# Patient Record
Sex: MALE | Race: BLACK OR AFRICAN AMERICAN | NOT HISPANIC OR LATINO | Employment: FULL TIME | URBAN - METROPOLITAN AREA
[De-identification: names, ages, dates, MRNs, and addresses within clinical notes are randomized per-mention and may not be internally consistent; named-entity substitution may affect disease eponyms.]

---

## 2020-10-09 ENCOUNTER — ANESTHESIA EVENT (OUTPATIENT)
Dept: SURGERY | Facility: HOSPITAL | Age: 50
End: 2020-10-09
Payer: OTHER GOVERNMENT

## 2020-10-21 ENCOUNTER — HOSPITAL ENCOUNTER (OUTPATIENT)
Dept: PREADMISSION TESTING | Facility: HOSPITAL | Age: 50
Discharge: HOME | End: 2020-10-21
Attending: PODIATRIST
Payer: OTHER GOVERNMENT

## 2020-10-21 VITALS
RESPIRATION RATE: 18 BRPM | TEMPERATURE: 98 F | DIASTOLIC BLOOD PRESSURE: 78 MMHG | BODY MASS INDEX: 37.95 KG/M2 | HEART RATE: 69 BPM | SYSTOLIC BLOOD PRESSURE: 148 MMHG | WEIGHT: 256.19 LBS | OXYGEN SATURATION: 99 % | HEIGHT: 69 IN

## 2020-10-21 DIAGNOSIS — Z01.818 PREOPERATIVE EXAMINATION: ICD-10-CM

## 2020-10-21 RX ORDER — SODIUM CHLORIDE, SODIUM LACTATE, POTASSIUM CHLORIDE, CALCIUM CHLORIDE 600; 310; 30; 20 MG/100ML; MG/100ML; MG/100ML; MG/100ML
INJECTION, SOLUTION INTRAVENOUS CONTINUOUS
Status: CANCELLED | OUTPATIENT
Start: 2020-10-21

## 2020-10-21 RX ORDER — LIDOCAINE HYDROCHLORIDE 10 MG/ML
1 INJECTION, SOLUTION EPIDURAL; INFILTRATION; INTRACAUDAL; PERINEURAL ONCE
Status: CANCELLED | OUTPATIENT
Start: 2020-10-21 | End: 2020-10-21

## 2020-10-21 NOTE — DISCHARGE INSTRUCTIONS
Your surgery is scheduled for Friday 10/30    Please report to Front Lobby on the 1st Floor at 0530a.m.    THIS TIME IS SUBJECT TO CHANGE.  YOU WILL RECEIVE A PHONE CALL THE DAY BEFORE SURGERY BY 3:30 PM TO CONFIRM YOUR TIME OF ARRIVAL.  IF YOU HAVE NOT RECEIVED A PHONE CALL BY 3:30 PM THE DAY BEFORE YOUR SURGERY PLEASE CALL [phone]     INSTRUCTIONS IMPORTANT!!!  ¨ Do not eat or drink after 12 midnight-including water. OK to brush teeth, no   gum, candy or mints!    ¨ Take only these medicines with a small swallow of water-morning of surgery.        ____  Proceed to [facility] on for additional testing.        ____  Do not wear makeup, including mascara.  ____  No powder, lotions or creams to surgical area.  ____  Please remove all jewelry, including piercings and leave at home.  ____  No money or valuables needed. Please leave at home.  ____  Please bring any documents given by your doctor.  ____  If going home the same day, arrange for a ride home. You will not be able to             drive if Anesthesia was used.  ____  Children under 18 years require a parent / guardian present the entire time             they are in surgery / recovery.  ____  Wear loose fitting clothing. Allow for dressings, bandages.  ____  Stop Aspirin, Ibuprofen, Motrin and Aleve at least 3-5 days before surgery, unless otherwise instructed by your doctor, or the nurse.   You MAY use Tylenol/acetaminophen until day of surgery.  ____  Wash the surgical area with Hibiclens the night before surgery, and again the             morning of surgery.  Be sure to rinse hibiclens off completely (if instructed by   nurse).  ____  If you take diabetic medication, do not take am of surgery unless instructed by Doctor.  ____  Call MD for temperature above 101 degrees or any other signs of infection such as Urinary (bladder) infection, Upper respiratory infection, skin boils, etc.  ____ Stop taking any Fish Oil supplement or any Vitamins  that contain Vitamin E at least 5 days prior to surgery.  ____ Do Not wear your contact lenses the day of your procedure.  You may wear your glasses.      ____Do not shave surgical site for 3 days prior to surgery.  ____ Practice Good hand washing before, during, and after procedure.      I have read or had read and explained to me, and understand the above information.  Additional comments or instructions:  For additional questions call [phone]      ANESTHESIA SIDE EFFECTS  -For the first 24 hours after surgery:  Do not drive, use heavy equipment, make important decisions, or drink alcohol  -It is normal to feel sleepy for several hours.  Rest until you are more awake.  -Have someone stay with you, if needed.  They can watch for problems and help keep you safe.  -Some possible post anesthesia side effects include: nausea and vomiting, sore throat and hoarseness, sleepiness, and dizziness.        Pre-Op Bathing Instructions    Before surgery, you can play an important role in your own health.    Because skin is not sterile, we need to be sure that your skin is as free of germs as possible. By following the instructions below, you can reduce the number of germs on your skin before surgery.    IMPORTANT: You will need to shower with a special soap called Hibiclens*, available at any pharmacy.  If you are allergic to Chlorhexidine (the antiseptic in Hibiclens), use an antibacterial soap such as Dial Soap for your preoperative shower.  You will shower with Hibiclens both the night before your surgery and the morning of your surgery.  Do not use Hibiclens on the head, face or genitals to avoid injury to those areas.    STEP #1: THE NIGHT BEFORE YOUR SURGERY     1. Do not shave the area of your body where your surgery will be performed.  2. Shower and wash your hair and body as usual with your normal soap and shampoo.  3. Rinse your hair and body thoroughly after you shower to remove all soap residue.  4. With your hand,  apply one packet of Hibiclens soap to the surgical site.   5. Wash the site gently for five (5) minutes. Do not scrub your skin too hard.   6. Do not wash with your regular soap after Hibiclens is used.  7. Rinse your body thoroughly.  8. Pat yourself dry with a clean, soft towel.  9. Do not use lotion, cream, or powder.  10. Wear clean clothes.    STEP #2: THE MORNING OF YOUR SURGERY     1. Repeat Step #1.    * Not to be used by people allergic to Chlorhexidine.      Treating Mallet, Hammer, and Claw Toes  Definitions  A hammer toe has an abnormal bend in the middle joint of your toe (toe is bent upward at joint).  Mallet toe affects the joint nearest your toenail (toe is bent downward at joint).  Claw toe affects the joint at the ball of your foot (toe is bent upward at joint), as well as both toe joints (toe is bent downward at both joints).  Hammer toe and mallet toe are most likely to occur in the toe next to your big toe.  Causes  The most common cause for all 3 deformities is poorly fitting shoes and tight shoes, especially high heels for women. Trauma and nerve damage from various diseases like diabetes may also cause these deformities.  Treatment  Buying shoes with more room in the toes, filing down corns and calluses, and padding, taping, or strapping the toe most often relieve the pain. Toe stretching and exercises may also be helpful. If these steps dont work, you may need surgery to straighten your toes.  Shoes  Buy low-heeled shoes with plenty of room in the front. This keeps your toes from being jammed against the end of your shoe. It also keeps your shoe from rubbing the tops of your toes.  Corns and calluses    To file down a corn or callus, soak your foot in warm water. This softens the hard skin. Dry your foot. Then gently rub the corn or callus with a pumice stone or nail file.  Pads and splints  If you still have pain, you may need to put a pad or splint on your toe. This helps take pressure  off the painful corn or callus.  · For a mallet toe, you can put a gel pad on the toe. This keeps the tip of the toe from rubbing against the bottom of the shoe.  · For a hammer or claw toe, you can put a felt or foam pad over the bent joint. This keeps the toe from rubbing on the top of the shoe.  · For a hammer or claw toe that is still flexible, you can put a splint on the toe. This keeps it straight so it doesn't rub on the top of the shoe.    Date Last Reviewed: 9/29/2015  © 9288-3760 [employer]. [address] [place] [address], [place] [address]. All rights reserved. This information is not intended as a substitute for professional medical care. Always follow your healthcare professional's instructions.        Types of Anesthesia  Your anesthesiologist is a key member of your surgical team. He or she gives you anesthetics (medications to keep you comfortable and decrease your awareness of surgery) and monitors your condition to keep you safe during surgery. You will have 1 of 3 kinds of anesthesia during your surgery.  Monitored anesthesia care (MAC)  · Often used for surgery that is short or not too invasive.  · Sedatives (medicines to relax you) are given through an IV (intravenous) line.  · The area around the surgical site is usually numbed with a local anesthetic.  · You may choose to remain awake or sleep lightly.  Regional anesthesia (sometimes called spinal epidural or juliet block)  · Often used for surgery on the arms, legs, and abdomen. It is also used during childbirth.  · A specific region of your body is numbed by injecting anesthetic near nerves, near your spine, or near the operative site.  · You may also be given sedatives through an IV line to relax you.  · With regional anesthesia, you may choose to remain awake or sleep lightly.  General anesthesia  · Often used for extensive surgery, such as on the heart, brain, or abdominal operation.  · Also used when the patient wants to be totally  asleep.  · May be given as a gas that you breathe and as medicines that are injected through an IV line.  · Because you are asleep, you feel no pain and remember nothing of the surgery.  The risks and complications of anesthesia depend on your overall health. If you are healthy, the risks are low. The risks are higher for patients with heart or lung problems. Your anesthesiologist or nurse anesthetist will discuss the risks with you.   Date Last Reviewed: 12/1/2016 © 2000-2017 [employer]. [address] [place] [address], [place] [address]. All rights reserved. This information is not intended as a substitute for professional medical care. Always follow your healthcare professional's instructions.

## 2020-10-21 NOTE — PRE-PROCEDURE INSTRUCTIONS
Covid 10/27 1050 [facility]        Allergies, medical, surgical, family and psychosocial histories reviewed with patient. Periop plan of care reviewed. Preop instructions given, including medications to take and to hold. Hibiclens soap and instructions on use given. Time allotted for questions to be addressed.  Patient verbalized understanding.      Wife Nguyen Matias

## 2020-10-21 NOTE — ANESTHESIA PREPROCEDURE EVALUATION
10/21/2020  Giorgi Matias is a 50 y.o., male scheduled for left hammer toe correction and endoscopic plantar fasciotomy on 10/30/2020.    Outside labs, x-ray, and optimization in media.    History reviewed. No pertinent past medical history.     Past Surgical History:   Procedure Laterality Date    CARPAL TUNNEL RELEASE Bilateral     SHOULDER ARTHROSCOPY W/ ROTATOR CUFF REPAIR Right     SHOULDER SURGERY       Anesthesia Evaluation    I have reviewed the Patient Summary Reports.    I have reviewed the Nursing Notes.    I have reviewed the Medications.     Review of Systems  Anesthesia Hx:  No problems with previous Anesthesia  Denies Family Hx of Anesthesia complications.    Social:  Non-Smoker, Social Alcohol Use    Hematology/Oncology:  Hematology Normal        Cardiovascular:  Cardiovascular Normal Exercise tolerance: good   Denies Angina.        Pulmonary:  Pulmonary Normal    Renal/:  Renal/ Normal     Hepatic/GI:  Hepatic/GI Normal    Neurological:  Neurology Normal    Endocrine:  Endocrine Normal        Physical Exam  General:  Obesity    Airway/Jaw/Neck:  Airway Findings: Mouth Opening: Normal Tongue: Normal  General Airway Assessment: Adult  Mallampati: III  TM Distance: Normal, at least 6 cm       Chest/Lungs:  Chest/Lungs Findings: Clear to auscultation, Normal Respiratory Rate     Heart/Vascular:  Heart Findings: Rate: Normal  Sounds: Normal        Mental Status:  Mental Status Findings:  Cooperative, Alert and Oriented         Anesthesia Plan  Type of Anesthesia, risks & benefits discussed:  Anesthesia Type:  MAC  Patient's Preference:   Intra-op Monitoring Plan: standard ASA monitors  Intra-op Monitoring Plan Comments:   Post Op Pain Control Plan: multimodal analgesia  Post Op Pain Control Plan Comments:   Induction:   IV  Beta Blocker:  Patient is not currently on a Beta-Blocker (No  further documentation required).       Informed Consent: Patient understands risks and agrees with Anesthesia plan.  Questions answered. Anesthesia consent signed with patient.  ASA Score: 2     Day of Surgery Review of History & Physical:        Anesthesia Plan Notes: Anesthesia consent to be signed prior to procedure on 10/30/2020  Covid testing scheduled 10/27          Ready For Surgery From Anesthesia Perspective.

## 2020-10-27 ENCOUNTER — LAB VISIT (OUTPATIENT)
Dept: SURGERY | Facility: CLINIC | Age: 50
End: 2020-10-27
Payer: OTHER GOVERNMENT

## 2020-10-27 DIAGNOSIS — Z01.818 PREOPERATIVE EXAMINATION: ICD-10-CM

## 2020-10-27 LAB — SARS-COV-2 RNA RESP QL NAA+PROBE: NOT DETECTED

## 2020-10-27 PROCEDURE — U0003 INFECTIOUS AGENT DETECTION BY NUCLEIC ACID (DNA OR RNA); SEVERE ACUTE RESPIRATORY SYNDROME CORONAVIRUS 2 (SARS-COV-2) (CORONAVIRUS DISEASE [COVID-19]), AMPLIFIED PROBE TECHNIQUE, MAKING USE OF HIGH THROUGHPUT TECHNOLOGIES AS DESCRIBED BY CMS-2020-01-R: HCPCS

## 2020-10-29 RX ORDER — CEFAZOLIN SODIUM 2 G/50ML
2 SOLUTION INTRAVENOUS ONCE
Status: CANCELLED | OUTPATIENT
Start: 2020-10-30

## 2020-10-30 ENCOUNTER — HOSPITAL ENCOUNTER (OUTPATIENT)
Facility: HOSPITAL | Age: 50
Discharge: HOME | End: 2020-10-30
Attending: PODIATRIST | Admitting: PODIATRIST
Payer: OTHER GOVERNMENT

## 2020-10-30 ENCOUNTER — ANESTHESIA (OUTPATIENT)
Dept: SURGERY | Facility: HOSPITAL | Age: 50
End: 2020-10-30
Payer: OTHER GOVERNMENT

## 2020-10-30 VITALS
RESPIRATION RATE: 18 BRPM | SYSTOLIC BLOOD PRESSURE: 173 MMHG | DIASTOLIC BLOOD PRESSURE: 98 MMHG | TEMPERATURE: 98 F | BODY MASS INDEX: 37.92 KG/M2 | HEART RATE: 79 BPM | WEIGHT: 256 LBS | HEIGHT: 69 IN | OXYGEN SATURATION: 98 %

## 2020-10-30 DIAGNOSIS — M20.42 HAMMERTOE OF LEFT FOOT: Primary | ICD-10-CM

## 2020-10-30 PROCEDURE — 71000015 HC POSTOP RECOV 1ST HR: Performed by: PODIATRIST

## 2020-10-30 PROCEDURE — 25000003 PHARM REV CODE 250: Performed by: NURSE ANESTHETIST, CERTIFIED REGISTERED

## 2020-10-30 PROCEDURE — 01480 ANES OPEN PX LOWER L/A/F NOS: CPT | Performed by: PODIATRIST

## 2020-10-30 PROCEDURE — 37000009 HC ANESTHESIA EA ADD 15 MINS: Performed by: PODIATRIST

## 2020-10-30 PROCEDURE — 25000003 PHARM REV CODE 250: Performed by: PODIATRIST

## 2020-10-30 PROCEDURE — 27201423 OPTIME MED/SURG SUP & DEVICES STERILE SUPPLY: Performed by: PODIATRIST

## 2020-10-30 PROCEDURE — 63600175 PHARM REV CODE 636 W HCPCS: Performed by: NURSE ANESTHETIST, CERTIFIED REGISTERED

## 2020-10-30 PROCEDURE — 36000707: Performed by: PODIATRIST

## 2020-10-30 PROCEDURE — 63600175 PHARM REV CODE 636 W HCPCS: Performed by: PODIATRIST

## 2020-10-30 PROCEDURE — 37000008 HC ANESTHESIA 1ST 15 MINUTES: Performed by: PODIATRIST

## 2020-10-30 PROCEDURE — 36000706: Performed by: PODIATRIST

## 2020-10-30 PROCEDURE — 71000016 HC POSTOP RECOV ADDL HR: Performed by: PODIATRIST

## 2020-10-30 RX ORDER — PROPOFOL 10 MG/ML
INJECTION, EMULSION INTRAVENOUS CONTINUOUS PRN
Status: DISCONTINUED | OUTPATIENT
Start: 2020-10-30 | End: 2020-10-30

## 2020-10-30 RX ORDER — LIDOCAINE HYDROCHLORIDE 10 MG/ML
INJECTION, SOLUTION EPIDURAL; INFILTRATION; INTRACAUDAL; PERINEURAL
Status: DISCONTINUED | OUTPATIENT
Start: 2020-10-30 | End: 2020-10-30 | Stop reason: HOSPADM

## 2020-10-30 RX ORDER — MIDAZOLAM HYDROCHLORIDE 1 MG/ML
INJECTION, SOLUTION INTRAMUSCULAR; INTRAVENOUS
Status: DISCONTINUED | OUTPATIENT
Start: 2020-10-30 | End: 2020-10-30

## 2020-10-30 RX ORDER — PROPOFOL 10 MG/ML
VIAL (ML) INTRAVENOUS
Status: DISCONTINUED | OUTPATIENT
Start: 2020-10-30 | End: 2020-10-30

## 2020-10-30 RX ORDER — FENTANYL CITRATE 50 UG/ML
INJECTION, SOLUTION INTRAMUSCULAR; INTRAVENOUS
Status: DISCONTINUED | OUTPATIENT
Start: 2020-10-30 | End: 2020-10-30

## 2020-10-30 RX ORDER — OXYCODONE AND ACETAMINOPHEN 10; 325 MG/1; MG/1
1 TABLET ORAL EVERY 4 HOURS PRN
Status: DISCONTINUED | OUTPATIENT
Start: 2020-10-30 | End: 2020-10-30 | Stop reason: HOSPADM

## 2020-10-30 RX ORDER — SODIUM CHLORIDE, SODIUM LACTATE, POTASSIUM CHLORIDE, CALCIUM CHLORIDE 600; 310; 30; 20 MG/100ML; MG/100ML; MG/100ML; MG/100ML
INJECTION, SOLUTION INTRAVENOUS CONTINUOUS PRN
Status: DISCONTINUED | OUTPATIENT
Start: 2020-10-30 | End: 2020-10-30

## 2020-10-30 RX ORDER — LIDOCAINE HCL/PF 100 MG/5ML
SYRINGE (ML) INTRAVENOUS
Status: DISCONTINUED | OUTPATIENT
Start: 2020-10-30 | End: 2020-10-30

## 2020-10-30 RX ORDER — ROPIVACAINE HYDROCHLORIDE 5 MG/ML
INJECTION, SOLUTION EPIDURAL; INFILTRATION; PERINEURAL
Status: DISCONTINUED | OUTPATIENT
Start: 2020-10-30 | End: 2020-10-30 | Stop reason: HOSPADM

## 2020-10-30 RX ADMIN — PROPOFOL 50 MG: 10 INJECTION, EMULSION INTRAVENOUS at 03:10

## 2020-10-30 RX ADMIN — LIDOCAINE HYDROCHLORIDE 100 MG: 20 INJECTION, SOLUTION INTRAVENOUS at 03:10

## 2020-10-30 RX ADMIN — PROPOFOL 100 MCG/KG/MIN: 10 INJECTION, EMULSION INTRAVENOUS at 03:10

## 2020-10-30 RX ADMIN — OXYCODONE HYDROCHLORIDE AND ACETAMINOPHEN 1 TABLET: 10; 325 TABLET ORAL at 04:10

## 2020-10-30 RX ADMIN — PROPOFOL 30 MG: 10 INJECTION, EMULSION INTRAVENOUS at 03:10

## 2020-10-30 RX ADMIN — SODIUM CHLORIDE, SODIUM LACTATE, POTASSIUM CHLORIDE, AND CALCIUM CHLORIDE: .6; .31; .03; .02 INJECTION, SOLUTION INTRAVENOUS at 03:10

## 2020-10-30 RX ADMIN — MIDAZOLAM 2 MG: 1 INJECTION INTRAMUSCULAR; INTRAVENOUS at 03:10

## 2020-10-30 RX ADMIN — DEXTROSE 2 G: 50 INJECTION, SOLUTION INTRAVENOUS at 03:10

## 2020-10-30 RX ADMIN — FENTANYL CITRATE 50 MCG: 50 INJECTION, SOLUTION INTRAMUSCULAR; INTRAVENOUS at 03:10

## 2020-10-30 NOTE — DISCHARGE INSTRUCTIONS
ANESTHESIA  -For the first 24 hours after surgery:  Do not drive, use heavy equipment, make important decisions, or drink alcohol  -It is normal to feel sleepy for several hours.  Rest until you are more awake.  -Have someone stay with you, if needed.  They can watch for problems and help keep you safe.  -Some possible post anesthesia side effects include: nausea and vomiting, sore throat and hoarseness, sleepiness, and dizziness.    PAIN  -If you have pain after surgery, pain medicine will help you feel better.  Take it as directed, before pain becomes severe.  Most pain relievers taken by mouth need at least 20-30 minutes to start working.  -Do not drive or drink alcohol while taking pain medicine.  -Pain medication can upset your stomach.  Taking them with a little food may help.  -Other ways to help control pain: elevation, ice, and relaxation  -Call your surgeon if still having unmanageable pain an hour after taking pain medicine.  -Pain medicine can cause constipation.  Taking an over-the counter stool softener while on prescription pain medicine and drinking plenty of fluids can prevent this side effect.  -Call your surgeon if you have severe side effects like: breathing problems, trouble waking up, dizziness, confusion, or severe constipation.    NAUSEA  -Some people have nausea after surgery.  This is often because of anesthesia, pain, pain medicine, or the stress of surgery.  -Do not push yourself to eat.  Start off with clear liquids and soup.  Slowly move to solid foods.  Don't eat fatty, rich, spicy foods at first.  Eat smaller amounts.  -If you develop persistent nausea and vomiting please notify your surgeon immediately.    BLEEDING  -Different types of surgery require different types of care and dressing changes.  It is important to follow all instructions and advice from your surgeon.  Change dressing as directed.  Call your surgeon for any concerns regarding postop bleeding.    SIGNS OF  INFECTION  -Signs of infection include: fever, swelling, drainage, and redness  -Notify your surgeon if you have a fever of 100.4 F (38.0 C) or higher.  -Notify your surgeon if you notice redness, swelling, increased pain, pus, or a foul smell at the incision site.            Endoscopic Plantar Fasciotomy (EPF): After Surgery  Although you may feel fine when you are discharged, it is best to have someone drive you home. Your doctor may want you to rest and recover at home for a few days. Ask your doctor when you can start walking again. If a compression dressing is used to control swelling, you may need to wear a special surgical shoe. You may also need to wear a short leg brace for up to 3 weeks.  Recovering at home    Expect your foot to feel numb right after the surgery. Then, as the local anesthesia wears off, youll probably feel some pain. To limit pain and swelling, ice the foot for 10 to 15 minutes several times a day. Also, raise the foot above heart level as often as you can. If you've been given pain medicines, take them as directed  Your first post-op visit  Your doctor may want to see you the first 1 to 2 weeks after surgery. During this post-op visit, your incisions will be checked to make sure they are healing. The compression dressing may be replaced with a smaller surgical dressing. If this occurs, you can probably start wearing tennis shoes.  When to call your healthcare provider   Call your healthcare provider if you have any of these:  · The dressing is too tight or there is marked swelling or numbness of the toes  · Pain despite taking medicines  · More than a few drops of blood at an incision site  · Signs of an infection, including fever, chills, and redness near an incision  · Skin discoloration beyond the dressing   Date Last Reviewed: 7/1/2016  © 2991-4697 "[employer]. [address] [place] [address], [place] [address]. All rights reserved. This information is not intended as a  substitute for professional medical care. Always follow your healthcare professional's instructions.

## 2020-10-30 NOTE — PLAN OF CARE
Pt meets discharge critiera, pain controlled, VSS, tolerating clear liquids, VSS. Discharge instructions given to pt and wife with time for questions and printed copy also given.

## 2020-10-30 NOTE — TRANSFER OF CARE
"Anesthesia Transfer of Care Note    Patient: Giorgi Matias    Procedure(s) Performed: Procedure(s) (LRB):  CORRECTION, HAMMER TOE (Left)  FASCIOTOMY, PLANTAR, ENDOSCOPIC (Left)    Patient location: OPS    Anesthesia Type: MAC    Transport from OR: Transported from OR on room air with adequate spontaneous ventilation    Post pain: adequate analgesia    Post assessment: no apparent anesthetic complications    Post vital signs: stable    Level of consciousness: awake, alert and oriented    Nausea/Vomiting: no nausea/vomiting    Complications: none    Transfer of care protocol was followed      Last vitals:   Visit Vitals  BP (!) 154/72 (BP Location: Right arm, Patient Position: Lying)   Pulse 74   Temp 36.7 °C (98.1 °F) (Skin)   Resp 16   Ht 5' 9" (1.753 m)   Wt 116.1 kg (256 lb)   SpO2 100%   BMI 37.80 kg/m²     "

## 2020-10-30 NOTE — BRIEF OP NOTE
[facility]  Brief Operative Note    Surgery Date: 10/30/2020     Surgeon(s) and Role:     * Kranthi Mahoney DPM - Primary    Assisting Surgeon: Lindsey Arvizu DPM-Resident    Pre-op Diagnosis:  Acquired hammer toe of left foot [M20.42]  Plantar fasciitis of left foot [M72.2]    Post-op Diagnosis:  Post-Op Diagnosis Codes:     * Acquired hammer toe of left foot [M20.42]     * Plantar fasciitis of left foot [M72.2]    Procedure(s) (LRB):  Partial lateral condylectomy 5th distal phalanx (Left)  FASCIOTOMY, PLANTAR, ENDOSCOPIC (Left)    Anesthesia: Local MAC    Description of the findings of the procedure(s):   Partial condylectomy of lateral 5th distal phalanx. Endoscopic plantar fasciotomy    Estimated Blood Loss: < 1 mL         Specimens:   Specimen (12h ago, onward)    None

## 2020-10-30 NOTE — PLAN OF CARE
Pt rec'd to OPS from OR with CRNA and RN, pt connected to monitors and assessed. Report rec'd. See flowsheets for VS and assessments.

## 2020-10-30 NOTE — DISCHARGE SUMMARY
[facility]  Discharge Note  Short Stay    Procedure(s) (LRB):  CORRECTION, HAMMER TOE (Left)  FASCIOTOMY, PLANTAR, ENDOSCOPIC (Left)    OUTCOME: Patient tolerated treatment/procedure well without complication and is now ready for discharge.    DISPOSITION: Home or Self Care    FINAL DIAGNOSIS:  <principal problem not specified>    FOLLOWUP: In clinic    DISCHARGE INSTRUCTIONS:    Discharge Procedure Orders   Diet general     Keep surgical extremity elevated     Ice to affected area   Order Comments: Apply ice pack to behind left knee     Call MD for:  temperature >100.4     Call MD for:  persistent nausea and vomiting     Call MD for:  severe uncontrolled pain     Call MD for:  difficulty breathing, headache or visual disturbances     Call MD for:  redness, tenderness, or signs of infection (pain, swelling, redness, odor or green/yellow discharge around incision site)     Call MD for:  hives     Call MD for:  persistent dizziness or light-headedness     Call MD for:  extreme fatigue     Leave dressing on - Keep it clean, dry, and intact until clinic visit     Weight bearing restrictions (specify)   Order Comments: Partial weightbearing to left heel only in post op surgical shoe

## 2020-10-30 NOTE — H&P
Primary        10/21/2020   Giorgi Matias is a 50 y.o., male scheduled for left hammer toe correction and endoscopic plantar fasciotomy on 10/30/2020.   Outside labs, x-ray, and optimization in media.   History reviewed. No pertinent past medical history.          Past Surgical History:   Procedure Laterality Date    CARPAL TUNNEL RELEASE Bilateral     SHOULDER ARTHROSCOPY W/ ROTATOR CUFF REPAIR Right     SHOULDER SURGERY     Anesthesia Evaluation     I have reviewed the Patient Summary Reports.   I have reviewed the Nursing Notes.   I have reviewed the Medications.   Review of Systems   Anesthesia Hx:   No problems with previous Anesthesia Denies Family Hx of Anesthesia complications.   Social:   Non-Smoker, Social Alcohol Use   Hematology/Oncology:   Hematology Normal   Cardiovascular:   Cardiovascular Normal Exercise tolerance: good Denies Angina.   Pulmonary:   Pulmonary Normal   Renal/:   Renal/ Normal   Hepatic/GI:   Hepatic/GI Normal   Neurological:   Neurology Normal   Endocrine:   Endocrine Normal       Physical Exam   General:   Obesity   Airway/Jaw/Neck:   Airway Findings: Mouth Opening: Normal Tongue: Normal General Airway Assessment: Adult Mallampati: III TM Distance: Normal, at least 6 cm   Chest/Lungs:   Chest/Lungs Findings: Clear to auscultation, Normal Respiratory Rate   Heart/Vascular:   Heart Findings: Rate: Normal Sounds: Normal   Mental Status:   Mental Status Findings: Cooperative, Alert and Oriented       Anesthesia Plan     Proceed with surgery

## 2020-11-01 NOTE — OP NOTE
[facility]  Operative Note      Date of Procedure: 10/30/2020     Procedure: Procedure(s) (LRB):  Partial lateral condylectomy 5th distal phalanx (Left)  FASCIOTOMY, PLANTAR, ENDOSCOPIC (Left)    Surgeon(s) and Role:     * Kranthi Mahoney DPM - Primary    Assisting Surgeon: None    Pre-Operative Diagnosis: Acquired hammer toe of left foot [M20.42]  Plantar fasciitis of left foot [M72.2]    Post-Operative Diagnosis: Post-Op Diagnosis Codes:     * Acquired hammer toe of left foot [M20.42]     * Plantar fasciitis of left foot [M72.2]    Anesthesia: Local MAC    Procedure in detail:  The patient was brought to the operating room on a stretcher in a supine position. Following the successful induction of MAC anesthesia, a well padded pneumatic tourniquet was applied on the left ankle. A timeout was performed verifying the patient's identity, surgical site, allergies, and medications. All were in agreement. A local infiltrative block was administered to the left foot using 20 ml of a 1:1 mixture of 1% lidocaine plain and 0.5% Ropivicaine plain. A timeout was performed verifying the patient's identity, surgical site, allergies, and medications. All were in agreement. The left foot was prepped and draped in a sterile manner. An esmarch was used to exsanguinate the left foot. Tourniquet was inflated to 250mmHg.    Attention was directed to the left medial heel anterior to the plantar fat pad where the medial portal was created with a  #15 scalpel through the skin. Blunt dissection was completed through the subcutaneous tissue using a hemostat. A blunt elevator was used to create a channel along the inferior portion of the plantar fascia to the lateral side of the foot. Blunt trochar and cannula was placed from medial to lateral and trochar removed. Q-tips were used to remove fat from the cannula. A 4.0 mm camera was used to visualize the plantar fascia and hook knife to cut the medial half of the plantar  fascia while dorsiflexing the foot and toes. The underlying abductor muscle was visualized confirming the plantar fascia had been cut medially. The incision was flushed with normal sterile saline solution and skin was reapproximated with 3-0 nylon.    Next, attention was directed to the left 5th toe. A curvilinear incision was made at the distal lateral 5th toe using a #15 blade. Sharp and blunt dissection was performed down to the level of bone in which the lateral condyle of the 5th distal phalanx was exposed. The lateral condyle was resected using a sagittal saw. Incision site was re approximated with 3-0 nylon suture.    Surgical incision sites were covered with triple antibiotic ointment and a sterile compressive dressing consisting of 4x4 gauze, Melany, and Ace bandage was applied to the left foot. The patient's left ankle tourniquet was deflated. Immediate capillary refill was noted to all digits of the left foot. The patient's left foot was placed in a surgical shoe and the patient was transferred to the recovery room under the care of anesthesia team with vital signs stable and vascular status intact.      Following a period of post op monitoring, the patient was discharged home with the following post op instructions:   1. Keep dressing dry and intact until Podiatry follow up.   2.Weight bearing status: partial weightbearing to heel only in post op surgical shoe left foot  3. All necessary prescriptions ordered and medical management will continue.   4. Contact Podiatry for all post op follow up care and if any problems arise.        Description of the Findings of the Procedure: as above    Significant Surgical Tasks Conducted by the Assistant(s), if Applicable: NA    Complications: No    Estimated Blood Loss (EBL): <1 mL           Implants: * No implants in log *    Specimens:   Specimen (12h ago, onward)    None                  Condition: Good    Disposition: PACU - hemodynamically  stable.    Attestation: I was present and scrubbed for the entire procedure.

## 2021-05-06 ENCOUNTER — PATIENT MESSAGE (OUTPATIENT)
Dept: RESEARCH | Facility: HOSPITAL | Age: 51
End: 2021-05-06

## 2021-09-23 ENCOUNTER — ANESTHESIA EVENT (OUTPATIENT)
Dept: SURGERY | Facility: HOSPITAL | Age: 51
End: 2021-09-23
Payer: OTHER GOVERNMENT

## 2021-09-24 ENCOUNTER — HOSPITAL ENCOUNTER (OUTPATIENT)
Facility: HOSPITAL | Age: 51
Discharge: HOME | End: 2021-09-24
Attending: PODIATRIST | Admitting: PODIATRIST
Payer: OTHER GOVERNMENT

## 2021-09-24 ENCOUNTER — ANESTHESIA (OUTPATIENT)
Dept: SURGERY | Facility: HOSPITAL | Age: 51
End: 2021-09-24
Payer: OTHER GOVERNMENT

## 2021-09-24 VITALS
BODY MASS INDEX: 38.51 KG/M2 | HEIGHT: 69 IN | DIASTOLIC BLOOD PRESSURE: 72 MMHG | WEIGHT: 260 LBS | SYSTOLIC BLOOD PRESSURE: 134 MMHG | HEART RATE: 88 BPM | TEMPERATURE: 98 F | RESPIRATION RATE: 16 BRPM | OXYGEN SATURATION: 98 %

## 2021-09-24 DIAGNOSIS — M72.2 PLANTAR FASCIITIS, LEFT: ICD-10-CM

## 2021-09-24 LAB — SARS-COV-2 RDRP RESP QL NAA+PROBE: NEGATIVE

## 2021-09-24 PROCEDURE — 25000003 PHARM REV CODE 250: Performed by: NURSE ANESTHETIST, CERTIFIED REGISTERED

## 2021-09-24 PROCEDURE — 63600175 PHARM REV CODE 636 W HCPCS: Performed by: NURSE ANESTHETIST, CERTIFIED REGISTERED

## 2021-09-24 PROCEDURE — 01470 ANES PX NRV MSC LW L/A/F NOS: CPT | Performed by: PODIATRIST

## 2021-09-24 PROCEDURE — 25000003 PHARM REV CODE 250: Performed by: PODIATRIST

## 2021-09-24 PROCEDURE — 37000008 HC ANESTHESIA 1ST 15 MINUTES: Performed by: PODIATRIST

## 2021-09-24 PROCEDURE — 71000015 HC POSTOP RECOV 1ST HR: Performed by: PODIATRIST

## 2021-09-24 PROCEDURE — 37000009 HC ANESTHESIA EA ADD 15 MINS: Performed by: PODIATRIST

## 2021-09-24 PROCEDURE — U0002 COVID-19 LAB TEST NON-CDC: HCPCS | Performed by: PODIATRIST

## 2021-09-24 PROCEDURE — 36000706: Performed by: PODIATRIST

## 2021-09-24 PROCEDURE — 36000707: Performed by: PODIATRIST

## 2021-09-24 PROCEDURE — 63600175 PHARM REV CODE 636 W HCPCS: Performed by: PODIATRIST

## 2021-09-24 RX ORDER — TRIAMCINOLONE ACETONIDE 40 MG/ML
INJECTION, SUSPENSION INTRA-ARTICULAR; INTRAMUSCULAR
Status: DISCONTINUED | OUTPATIENT
Start: 2021-09-24 | End: 2021-09-24 | Stop reason: HOSPADM

## 2021-09-24 RX ORDER — PROPOFOL 10 MG/ML
VIAL (ML) INTRAVENOUS CONTINUOUS PRN
Status: DISCONTINUED | OUTPATIENT
Start: 2021-09-24 | End: 2021-09-24

## 2021-09-24 RX ORDER — PROPOFOL 10 MG/ML
VIAL (ML) INTRAVENOUS
Status: DISCONTINUED | OUTPATIENT
Start: 2021-09-24 | End: 2021-09-24

## 2021-09-24 RX ORDER — FENTANYL CITRATE 50 UG/ML
INJECTION, SOLUTION INTRAMUSCULAR; INTRAVENOUS
Status: DISCONTINUED | OUTPATIENT
Start: 2021-09-24 | End: 2021-09-24

## 2021-09-24 RX ORDER — CEFAZOLIN SODIUM 2 G/50ML
2 SOLUTION INTRAVENOUS ONCE
Status: DISCONTINUED | OUTPATIENT
Start: 2021-09-24 | End: 2021-09-24

## 2021-09-24 RX ORDER — ROPIVACAINE HYDROCHLORIDE 5 MG/ML
INJECTION, SOLUTION EPIDURAL; INFILTRATION; PERINEURAL
Status: DISCONTINUED | OUTPATIENT
Start: 2021-09-24 | End: 2021-09-24 | Stop reason: HOSPADM

## 2021-09-24 RX ORDER — SODIUM CHLORIDE, SODIUM LACTATE, POTASSIUM CHLORIDE, CALCIUM CHLORIDE 600; 310; 30; 20 MG/100ML; MG/100ML; MG/100ML; MG/100ML
INJECTION, SOLUTION INTRAVENOUS CONTINUOUS PRN
Status: DISCONTINUED | OUTPATIENT
Start: 2021-09-24 | End: 2021-09-24

## 2021-09-24 RX ORDER — CEFAZOLIN SODIUM 2 G/50ML
2 SOLUTION INTRAVENOUS ONCE
Status: DISCONTINUED | OUTPATIENT
Start: 2021-09-24 | End: 2021-09-24 | Stop reason: HOSPADM

## 2021-09-24 RX ORDER — LIDOCAINE HYDROCHLORIDE 10 MG/ML
INJECTION, SOLUTION EPIDURAL; INFILTRATION; INTRACAUDAL; PERINEURAL
Status: DISCONTINUED | OUTPATIENT
Start: 2021-09-24 | End: 2021-09-24 | Stop reason: HOSPADM

## 2021-09-24 RX ORDER — MIDAZOLAM HYDROCHLORIDE 1 MG/ML
INJECTION INTRAMUSCULAR; INTRAVENOUS
Status: DISCONTINUED | OUTPATIENT
Start: 2021-09-24 | End: 2021-09-24

## 2021-09-24 RX ORDER — LIDOCAINE HYDROCHLORIDE 20 MG/ML
INJECTION, SOLUTION EPIDURAL; INFILTRATION; INTRACAUDAL; PERINEURAL
Status: DISCONTINUED | OUTPATIENT
Start: 2021-09-24 | End: 2021-09-24

## 2021-09-24 RX ADMIN — LIDOCAINE HYDROCHLORIDE 80 MG: 20 INJECTION, SOLUTION EPIDURAL; INFILTRATION; INTRACAUDAL; PERINEURAL at 07:09

## 2021-09-24 RX ADMIN — FENTANYL CITRATE 50 MCG: 50 INJECTION, SOLUTION INTRAMUSCULAR; INTRAVENOUS at 08:09

## 2021-09-24 RX ADMIN — MIDAZOLAM HYDROCHLORIDE 2 MG: 1 INJECTION, SOLUTION INTRAMUSCULAR; INTRAVENOUS at 07:09

## 2021-09-24 RX ADMIN — SODIUM CHLORIDE, SODIUM LACTATE, POTASSIUM CHLORIDE, AND CALCIUM CHLORIDE: .6; .31; .03; .02 INJECTION, SOLUTION INTRAVENOUS at 07:09

## 2021-09-24 RX ADMIN — FENTANYL CITRATE 50 MCG: 50 INJECTION, SOLUTION INTRAMUSCULAR; INTRAVENOUS at 07:09

## 2021-09-24 RX ADMIN — PROPOFOL 80 MG: 10 INJECTION, EMULSION INTRAVENOUS at 07:09

## 2021-09-24 RX ADMIN — PROPOFOL 80 MCG/KG/MIN: 10 INJECTION, EMULSION INTRAVENOUS at 07:09

## 2022-01-04 ENCOUNTER — OFFICE VISIT (OUTPATIENT)
Dept: PAIN MEDICINE | Facility: CLINIC | Age: 52
End: 2022-01-04
Payer: OTHER GOVERNMENT

## 2022-01-04 VITALS
DIASTOLIC BLOOD PRESSURE: 82 MMHG | WEIGHT: 260 LBS | SYSTOLIC BLOOD PRESSURE: 145 MMHG | HEIGHT: 69 IN | HEART RATE: 80 BPM | BODY MASS INDEX: 38.51 KG/M2

## 2022-01-04 DIAGNOSIS — M47.896 OTHER SPONDYLOSIS, LUMBAR REGION: Primary | ICD-10-CM

## 2022-01-04 PROCEDURE — 99204 PR OFFICE/OUTPT VISIT, NEW, LEVL IV, 45-59 MIN: ICD-10-PCS | Mod: S$PBB,,, | Performed by: ANESTHESIOLOGY

## 2022-01-04 PROCEDURE — 99213 OFFICE O/P EST LOW 20 MIN: CPT | Mod: PBBFAC,PN | Performed by: ANESTHESIOLOGY

## 2022-01-04 PROCEDURE — 99999 PR PBB SHADOW E&M-EST. PATIENT-LVL III: CPT | Mod: PBBFAC,,, | Performed by: ANESTHESIOLOGY

## 2022-01-04 PROCEDURE — 99204 OFFICE O/P NEW MOD 45 MIN: CPT | Mod: S$PBB,,, | Performed by: ANESTHESIOLOGY

## 2022-01-04 PROCEDURE — 99999 PR PBB SHADOW E&M-EST. PATIENT-LVL III: ICD-10-PCS | Mod: PBBFAC,,, | Performed by: ANESTHESIOLOGY

## 2022-01-04 RX ORDER — CHOLECALCIFEROL (VITAMIN D3) 50 MCG
TABLET ORAL
COMMUNITY
Start: 2021-05-17

## 2022-01-04 RX ORDER — OMEGA-3S/DHA/EPA/FISH OIL/D3 300MG-1000
CAPSULE ORAL
COMMUNITY
Start: 2021-07-23 | End: 2022-01-04

## 2022-01-04 RX ORDER — ROSUVASTATIN CALCIUM 10 MG/1
1 TABLET, COATED ORAL NIGHTLY
COMMUNITY
Start: 2021-05-17 | End: 2023-12-14

## 2022-01-04 RX ORDER — MELOXICAM 15 MG/1
15 TABLET ORAL DAILY
Qty: 90 TABLET | Refills: 0 | Status: SHIPPED | OUTPATIENT
Start: 2022-01-04 | End: 2023-12-08

## 2022-01-04 RX ORDER — TERBINAFINE HYDROCHLORIDE 250 MG/1
250 TABLET ORAL DAILY
COMMUNITY
Start: 2021-10-25 | End: 2023-12-08

## 2022-01-04 RX ORDER — AMOXICILLIN 500 MG/1
500 CAPSULE ORAL 2 TIMES DAILY
COMMUNITY
Start: 2021-10-15 | End: 2023-12-08

## 2022-01-04 RX ORDER — MIRTAZAPINE 30 MG/1
TABLET, FILM COATED ORAL
COMMUNITY
Start: 2021-09-30 | End: 2023-12-14

## 2022-01-04 RX ORDER — CHOLECALCIFEROL (VITAMIN D3) 50 MCG
TABLET ORAL
COMMUNITY
Start: 2021-07-23 | End: 2022-01-04

## 2022-01-04 RX ORDER — DICLOFENAC SODIUM 10 MG/G
GEL TOPICAL
COMMUNITY
Start: 2021-11-11

## 2022-01-04 RX ORDER — TRAZODONE HYDROCHLORIDE 50 MG/1
TABLET ORAL NIGHTLY PRN
COMMUNITY
Start: 2021-07-28

## 2022-01-04 NOTE — PROGRESS NOTES
"Referring Physician: [facility], [place], operated by [facility]*    PCP: Primary Doctor No    CC: back pain    HPI:   Giorgi Matias is a 51 y.o. male with PMH significant for hx of left heel open plantar fascia release (9/24/2021), hx of right rotator cuff repair, and hx of b/l CTS release presents as a referral for the evaluation of back pain. The patient reports that his pain began approximately 4-5 years ago after no inciting incident or trauma. The patient reports of worsening of his pain since onset. The patient localizes his pain to the area across his lower back. The patient denies of radiation of his pain. The patient describes his pain as a "tightness". The patient reports of intermittent numbness in his BLE's. The patient reports that his pain is a 5-6/10. The patient reports that his pain can increase to a 9-10/10 at its worst. Patient denies of any urinary/fecal incontinence, saddle anesthesia, or weakness.     Aggravating factors: bending, walking, sitting on the toilet    Mitigating factors: laying down    Relevant Surgeries: no    Interventional Therapies: no    : Not applicable      Non-pharmacologic Treatment:     · Physical Therapy: yes  · Ice/Heat: no  · TENS: yes  · Massage: no  · Chiropractic care: no  · Acupuncture: no         Pain Medications:         · Currently taking: OTC aleve    · Has tried in the past:    · Opioids: yes  · NSAIDS: yes; mild benefit with aleve  · Tylenol: yes  · Muscle relaxants: yes; tried in the past  · TCAs: no  · SNRIs: no  · Anticonvulsants: yes; tried gabapentin years ago - stopped taking as it provided him with no relief  · topical creams: yes; Voltaren     Anticoagulation: no    ROS:  Review of Systems   Constitutional: Negative for chills and fever.   HENT: Negative for tinnitus.    Eyes: Negative for visual disturbance.   Respiratory: Negative for shortness of breath.    Cardiovascular: Negative for chest pain.   Gastrointestinal: Negative for nausea and vomiting. " "  Genitourinary: Negative for difficulty urinating.   Musculoskeletal: Positive for back pain.   Skin: Negative for rash.   Allergic/Immunologic: Negative for immunocompromised state.   Neurological: Positive for numbness. Negative for syncope.   Hematological: Does not bruise/bleed easily.   Psychiatric/Behavioral: Negative for suicidal ideas.        Past Medical History:   Diagnosis Date    Sleep apnea      Past Surgical History:   Procedure Laterality Date    CARPAL TUNNEL RELEASE Bilateral     CORRECTION OF HAMMER TOE Left 10/30/2020    Procedure: CORRECTION, HAMMER TOE;  Surgeon: Kranthi Mahoney DPM;  Location: [facility] OR;  Service: Podiatry;  Laterality: Left;  LOCAL MAC + REGIONAL  Brice Vigueradha notified 10/19, EF brice confirmed 10/29/2020 KB    ENDOSCOPIC PLANTAR FASCIOTOMY Left 10/30/2020    Procedure: FASCIOTOMY, PLANTAR, ENDOSCOPIC;  Surgeon: Kranthi Mahoney DPM;  Location: [facility] OR;  Service: Podiatry;  Laterality: Left;  video    SHOULDER ARTHROSCOPY W/ ROTATOR CUFF REPAIR Right     SHOULDER SURGERY      SURGICAL REMOVAL OF FASCIA Left 9/24/2021    Procedure: FASCIECTOMY;  Surgeon: Kranthi Mahoney DPM;  Location: [facility] OR;  Service: Podiatry;  Laterality: Left;  Anesthesia: Regional Nerve Block  Rep:None     No family history on file.  Social History     Socioeconomic History    Marital status:    Tobacco Use    Smoking status: Never Smoker    Smokeless tobacco: Never Used   Substance and Sexual Activity    Alcohol use: Yes    Drug use: Never         Allergies: See med card    Vitals:    01/04/22 0823   Weight: 117.9 kg (260 lb)   Height: 5' 9" (1.753 m)   PainSc:   5   PainLoc: Back         Physical exam:  Physical Exam  Vitals and nursing note reviewed.   Constitutional:       Appearance: He is not diaphoretic.   HENT:      Head: Normocephalic and atraumatic.   Eyes:      General:         Right eye: No discharge.         Left eye: No discharge.      Extraocular Movements: EOM normal. "      Conjunctiva/sclera: Conjunctivae normal.   Cardiovascular:      Rate and Rhythm: Normal rate.   Pulmonary:      Effort: Pulmonary effort is normal. No respiratory distress.      Breath sounds: Normal breath sounds.   Abdominal:      Palpations: Abdomen is soft.   Skin:     General: Skin is warm and dry.      Findings: No rash.   Neurological:      Mental Status: He is alert and oriented to person, place, and time.   Psychiatric:         Mood and Affect: Mood and affect normal.         Cognition and Memory: Memory normal.         Judgment: Judgment normal.          UPPER EXTREMITIES: Normal alignment, normal range of motion, no atrophy, no skin changes,  hair growth and nail growth normal and equal bilaterally. No swelling, no tenderness.    LOWER EXTREMITIES:  Normal alignment, normal range of motion, no atrophy, no skin changes,  hair growth and nail growth normal and equal bilaterally. No swelling, no tenderness.    LUMBAR SPINE  Lumbar spine: ROM is full with flexion extension and oblique extension with increased pain with extension.     ((--)) Supine straight leg raise    ((+)) Facet loading   Internal and external rotation of the hip causes no increased pain on either side.  Myofascial exam: No tenderness to palpation across lumbar paraspinous muscles.    ((--)) TTP at the SI joint  ((--)) RAHEEL's test  ((--)) One leg stand    ((--)) Distraction test  ((--)) Thigh thrust    CRANIAL NERVES:  II:  PERRL bilaterally,   III,IV,VI: EOMI.    V:  Facial sensation equal bilaterally  VII:  Facial motor function normal.  VIII:  Hearing equal to finger rub bilaterally  IX/X: Gag normal, palate symmetric  XI:  Shoulder shrug equal, head turn equal  XII:  Tongue midline without fasciculations      MOTOR: Tone and bulk: normal bilateral upper and lower Strength: normal   Delt Bi Tri WE WF     R 5 5 5 5 5 5   L 5 5 5 5 5 5     IP ADD ABD Quad TA Gas HAM  R 5 5 5 5 5 5 5  L 5 5 5 5 5 5 5    SENSATION: Light touch and  "pinprick intact bilaterally  REFLEXES: normal, symmetric, nonbrisk.  Toes down, no clonus. Negative flor's sign bilaterally.  GAIT: normal rise, base, steps, and arm swing.      Imaging: [facility] [place] [facility]. Patient reports that he was told he had "severe arthritis."     Assessment: Giorgi Matias is a 51 y.o. male with PMH significant for hx of left heel open plantar fascia release (9/24/2021), hx of right rotator cuff repair, and hx of b/l CTS release presents as a referral for the evaluation of back pain. Patient reports that he had a [facility] done at the [facility] in [place] [facility] where he was told he had "severe arthritis." This was not available for my review. Treatment plan outlined below.     Plan:  - Recommend starting meloxicam 15 mg daily as needed for pain. The patient was advised that NSAID-type medications have two very important potential side effects: gastrointestinal irritation including hemorrhage and renal injuries. The patient was asked to take the medication with food and to cease therapy in the presence of any abnormal GI symptoms. Do not take this with any other medications in the NSAID family, such as ibuprofen, aspirin, and naproxen.  - DIONISIO for [facility] of the lumbar spine at the [facility] in [place]   - I have stressed the importance of physical activity and a home exercise plan to help with chronic pain and improve health.  - RTC s/p imaging to discuss results and interventions as appropriate.    Thank you for referring this interesting patient, and I look forward to continuing to collaborate in his care.    Mj Babcock MD  Pain Management    "

## 2022-01-25 ENCOUNTER — OFFICE VISIT (OUTPATIENT)
Dept: PAIN MEDICINE | Facility: CLINIC | Age: 52
End: 2022-01-25
Payer: OTHER GOVERNMENT

## 2022-01-25 VITALS
HEIGHT: 69 IN | WEIGHT: 260 LBS | HEART RATE: 80 BPM | OXYGEN SATURATION: 100 % | SYSTOLIC BLOOD PRESSURE: 160 MMHG | DIASTOLIC BLOOD PRESSURE: 87 MMHG | BODY MASS INDEX: 38.51 KG/M2 | TEMPERATURE: 99 F

## 2022-01-25 DIAGNOSIS — M51.36 DDD (DEGENERATIVE DISC DISEASE), LUMBAR: ICD-10-CM

## 2022-01-25 DIAGNOSIS — M47.896 OTHER SPONDYLOSIS, LUMBAR REGION: Primary | ICD-10-CM

## 2022-01-25 DIAGNOSIS — Z01.818 PRE-OP TESTING: ICD-10-CM

## 2022-01-25 PROCEDURE — 99214 PR OFFICE/OUTPT VISIT, EST, LEVL IV, 30-39 MIN: ICD-10-PCS | Mod: S$PBB,,, | Performed by: ANESTHESIOLOGY

## 2022-01-25 PROCEDURE — 99999 PR PBB SHADOW E&M-EST. PATIENT-LVL III: ICD-10-PCS | Mod: PBBFAC,,, | Performed by: ANESTHESIOLOGY

## 2022-01-25 PROCEDURE — 99999 PR PBB SHADOW E&M-EST. PATIENT-LVL III: CPT | Mod: PBBFAC,,, | Performed by: ANESTHESIOLOGY

## 2022-01-25 PROCEDURE — 99213 OFFICE O/P EST LOW 20 MIN: CPT | Mod: PBBFAC,PN | Performed by: ANESTHESIOLOGY

## 2022-01-25 PROCEDURE — 99214 OFFICE O/P EST MOD 30 MIN: CPT | Mod: S$PBB,,, | Performed by: ANESTHESIOLOGY

## 2022-01-25 NOTE — PROGRESS NOTES
"FOLLOW UP NOTE:     CHIEF COMPLAINT: back pain    INITIAL HISTORY OF PRESENT ILLNESS: Giorgi Matias is a 51 y.o. male with PMH significant for hx of left heel open plantar fascia release (9/24/2021), hx of right rotator cuff repair, and hx of b/l CTS release presents as a referral for the evaluation of back pain. The patient reports that his pain began approximately 4-5 years ago after no inciting incident or trauma. The patient reports of worsening of his pain since onset. The patient localizes his pain to the area across his lower back. The patient denies of radiation of his pain. The patient describes his pain as a "tightness". The patient reports of intermittent numbness in his BLE's. The patient reports that his pain is a 5-6/10. The patient reports that his pain can increase to a 9-10/10 at its worst. Patient denies of any urinary/fecal incontinence, saddle anesthesia, or weakness.      Aggravating factors: bending, walking, sitting on the toilet     Mitigating factors: laying down    INTERVAL HISTORY OF PRESENT ILLNESS: Giorgi Matias is a 51 y.o. male with PMH significant for hx of left heel open plantar fascia release (9/24/2021), hx of right rotator cuff repair, and hx of b/l CTS release presents as an established patient for the continued management of back pain. The patient localizes his pain to the area across his lower back. The patient denies of radiation of his pain. The patient reports that certain physical activities worsen his pain such as bending. The patient denies of benefit with Mobic. The patient reports that his pain is a 6/10. Patient denies of any urinary/fecal incontinence, saddle anesthesia, or weakness.     The patient presents for my review of his MRI of the lumbar spine which the patient was able to access through his phone portal.     INTERVENTIONAL PAIN HISTORY: N/A    CURRENT PAIN MEDICATIONS:    meloxicam 15 mg daily - no benefit       ROS:  Review of Systems   Constitutional: " "Negative for chills and fever.   HENT: Negative for tinnitus.    Eyes: Negative for visual disturbance.   Respiratory: Negative for shortness of breath.    Cardiovascular: Negative for chest pain.   Gastrointestinal: Negative for nausea and vomiting.   Genitourinary: Negative for difficulty urinating.   Musculoskeletal: Positive for arthralgias and back pain.   Skin: Negative for rash.   Allergic/Immunologic: Negative for immunocompromised state.   Neurological: Positive for numbness. Negative for syncope.   Hematological: Does not bruise/bleed easily.   Psychiatric/Behavioral: Negative for suicidal ideas.        MEDICAL, SURGICAL, FAMILY, SOCIAL HX: reviewed    MEDICATIONS/ALLERGIES: reviewed    PHYSICAL EXAM:    VITALS: Vitals reviewed.   Vitals:    01/25/22 0755   BP: (!) 160/87   Pulse: 80   Temp: 98.6 °F (37 °C)   SpO2: 100%   Weight: 117.9 kg (260 lb)   Height: 5' 9" (1.753 m)   PainSc:   6   PainLoc: Back       Physical Exam  Vitals and nursing note reviewed.   Constitutional:       Appearance: He is not diaphoretic.   HENT:      Head: Normocephalic and atraumatic.   Eyes:      General:         Right eye: No discharge.         Left eye: No discharge.      Extraocular Movements: EOM normal.      Conjunctiva/sclera: Conjunctivae normal.   Cardiovascular:      Rate and Rhythm: Normal rate.   Pulmonary:      Effort: Pulmonary effort is normal. No respiratory distress.      Breath sounds: Normal breath sounds.   Abdominal:      Palpations: Abdomen is soft.   Skin:     General: Skin is warm and dry.      Findings: No rash.   Neurological:      Mental Status: He is alert and oriented to person, place, and time.   Psychiatric:         Mood and Affect: Mood and affect normal.         Cognition and Memory: Memory normal.         Judgment: Judgment normal.          UPPER EXTREMITIES: Normal alignment, normal range of motion, no atrophy, no skin changes,  hair growth and nail growth normal and equal bilaterally. No " swelling, no tenderness.    LOWER EXTREMITIES:  Normal alignment, normal range of motion, no atrophy, no skin changes,  hair growth and nail growth normal and equal bilaterally. No swelling, no tenderness.     LUMBAR SPINE  Lumbar spine: ROM is full with flexion extension and oblique extension with increased pain with extension.     ((--)) Supine straight leg raise    ((+)) Facet loading   Internal and external rotation of the hip causes no increased pain on either side.  Myofascial exam: No tenderness to palpation across lumbar paraspinous muscles.     MOTOR: Tone and bulk: normal bilateral upper and lower Strength: normal   Delt      Bi         Tri        WE      WF                        R          5          5          5          5          5          5            L          5          5          5          5          5          5               IP         ADD     ABD     Quad   TA        Gas      HAM  R          5          5          5          5          5          5          5  L          5          5          5          5          5          5          5     SENSATION: Light touch and pinprick intact bilaterally  REFLEXES: normal, symmetric, nonbrisk.  Toes down, no clonus. Negative flro's sign bilaterally.  GAIT: normal rise, base, steps, and arm swing.      IMAGING: MRI report significant for multi-level DDD, facet arthropathy, and neural foraminal stenosis.     ASSESSMENT: Giorgi Matias is a 51 y.o. male with PMH significant for hx of left heel open plantar fascia release (9/24/2021), hx of right rotator cuff repair, and hx of b/l CTS release presents as an established patient for the continued management of back pain. The patient localizes his pain to the area across his lower back without radiation of his pain. MRI report significant for multi-level DDD, facet arthropathy, and neural foraminal stenosis. I believe all the aforementioned pathologies are contributing to his current pain. Treatment  plan outlined below.     PLAN:  1. I believe his low back pain maybe due to facet arthropathy and have recommended lumbar medial branch blocks as a diagnostic procedure.  If successful, would proceed with radiofrequency ablation. Schedule for bilateral L2, L3, L4, and L5 medial branch blocks.   2. I have stressed the importance of physical activity and a home exercise plan to help with chronic pain and improve health.  3. Continue Mobic daily for pain and inflammation  4. RTC for the procedure as outlined above     Mj Babcock MD  Pain Management    Addendum:           MRI

## 2022-01-25 NOTE — H&P (VIEW-ONLY)
"FOLLOW UP NOTE:     CHIEF COMPLAINT: back pain    INITIAL HISTORY OF PRESENT ILLNESS: Giorgi Matias is a 51 y.o. male with PMH significant for hx of left heel open plantar fascia release (9/24/2021), hx of right rotator cuff repair, and hx of b/l CTS release presents as a referral for the evaluation of back pain. The patient reports that his pain began approximately 4-5 years ago after no inciting incident or trauma. The patient reports of worsening of his pain since onset. The patient localizes his pain to the area across his lower back. The patient denies of radiation of his pain. The patient describes his pain as a "tightness". The patient reports of intermittent numbness in his BLE's. The patient reports that his pain is a 5-6/10. The patient reports that his pain can increase to a 9-10/10 at its worst. Patient denies of any urinary/fecal incontinence, saddle anesthesia, or weakness.      Aggravating factors: bending, walking, sitting on the toilet     Mitigating factors: laying down    INTERVAL HISTORY OF PRESENT ILLNESS: Giorgi Matias is a 51 y.o. male with PMH significant for hx of left heel open plantar fascia release (9/24/2021), hx of right rotator cuff repair, and hx of b/l CTS release presents as an established patient for the continued management of back pain. The patient localizes his pain to the area across his lower back. The patient denies of radiation of his pain. The patient reports that certain physical activities worsen his pain such as bending. The patient denies of benefit with Mobic. The patient reports that his pain is a 6/10. Patient denies of any urinary/fecal incontinence, saddle anesthesia, or weakness.     The patient presents for my review of his MRI of the lumbar spine which the patient was able to access through his phone portal.     INTERVENTIONAL PAIN HISTORY: N/A    CURRENT PAIN MEDICATIONS:    meloxicam 15 mg daily - no benefit       ROS:  Review of Systems   Constitutional: " "Negative for chills and fever.   HENT: Negative for tinnitus.    Eyes: Negative for visual disturbance.   Respiratory: Negative for shortness of breath.    Cardiovascular: Negative for chest pain.   Gastrointestinal: Negative for nausea and vomiting.   Genitourinary: Negative for difficulty urinating.   Musculoskeletal: Positive for arthralgias and back pain.   Skin: Negative for rash.   Allergic/Immunologic: Negative for immunocompromised state.   Neurological: Positive for numbness. Negative for syncope.   Hematological: Does not bruise/bleed easily.   Psychiatric/Behavioral: Negative for suicidal ideas.        MEDICAL, SURGICAL, FAMILY, SOCIAL HX: reviewed    MEDICATIONS/ALLERGIES: reviewed    PHYSICAL EXAM:    VITALS: Vitals reviewed.   Vitals:    01/25/22 0755   BP: (!) 160/87   Pulse: 80   Temp: 98.6 °F (37 °C)   SpO2: 100%   Weight: 117.9 kg (260 lb)   Height: 5' 9" (1.753 m)   PainSc:   6   PainLoc: Back       Physical Exam  Vitals and nursing note reviewed.   Constitutional:       Appearance: He is not diaphoretic.   HENT:      Head: Normocephalic and atraumatic.   Eyes:      General:         Right eye: No discharge.         Left eye: No discharge.      Extraocular Movements: EOM normal.      Conjunctiva/sclera: Conjunctivae normal.   Cardiovascular:      Rate and Rhythm: Normal rate.   Pulmonary:      Effort: Pulmonary effort is normal. No respiratory distress.      Breath sounds: Normal breath sounds.   Abdominal:      Palpations: Abdomen is soft.   Skin:     General: Skin is warm and dry.      Findings: No rash.   Neurological:      Mental Status: He is alert and oriented to person, place, and time.   Psychiatric:         Mood and Affect: Mood and affect normal.         Cognition and Memory: Memory normal.         Judgment: Judgment normal.          UPPER EXTREMITIES: Normal alignment, normal range of motion, no atrophy, no skin changes,  hair growth and nail growth normal and equal bilaterally. No " swelling, no tenderness.    LOWER EXTREMITIES:  Normal alignment, normal range of motion, no atrophy, no skin changes,  hair growth and nail growth normal and equal bilaterally. No swelling, no tenderness.     LUMBAR SPINE  Lumbar spine: ROM is full with flexion extension and oblique extension with increased pain with extension.     ((--)) Supine straight leg raise    ((+)) Facet loading   Internal and external rotation of the hip causes no increased pain on either side.  Myofascial exam: No tenderness to palpation across lumbar paraspinous muscles.     MOTOR: Tone and bulk: normal bilateral upper and lower Strength: normal   Delt      Bi         Tri        WE      WF                        R          5          5          5          5          5          5            L          5          5          5          5          5          5               IP         ADD     ABD     Quad   TA        Gas      HAM  R          5          5          5          5          5          5          5  L          5          5          5          5          5          5          5     SENSATION: Light touch and pinprick intact bilaterally  REFLEXES: normal, symmetric, nonbrisk.  Toes down, no clonus. Negative flor's sign bilaterally.  GAIT: normal rise, base, steps, and arm swing.      IMAGING: MRI report significant for multi-level DDD, facet arthropathy, and neural foraminal stenosis.     ASSESSMENT: Giorgi Matias is a 51 y.o. male with PMH significant for hx of left heel open plantar fascia release (9/24/2021), hx of right rotator cuff repair, and hx of b/l CTS release presents as an established patient for the continued management of back pain. The patient localizes his pain to the area across his lower back without radiation of his pain. MRI report significant for multi-level DDD, facet arthropathy, and neural foraminal stenosis. I believe all the aforementioned pathologies are contributing to his current pain. Treatment  plan outlined below.     PLAN:  1. I believe his low back pain maybe due to facet arthropathy and have recommended lumbar medial branch blocks as a diagnostic procedure.  If successful, would proceed with radiofrequency ablation. Schedule for bilateral L2, L3, L4, and L5 medial branch blocks.   2. I have stressed the importance of physical activity and a home exercise plan to help with chronic pain and improve health.  3. Continue Mobic daily for pain and inflammation  4. RTC for the procedure as outlined above     Mj Babcock MD  Pain Management    Addendum:           MRI

## 2022-02-04 ENCOUNTER — LAB VISIT (OUTPATIENT)
Dept: PRIMARY CARE CLINIC | Facility: CLINIC | Age: 52
End: 2022-02-04
Payer: OTHER GOVERNMENT

## 2022-02-04 DIAGNOSIS — Z01.818 PRE-OP TESTING: ICD-10-CM

## 2022-02-04 PROCEDURE — U0005 INFEC AGEN DETEC AMPLI PROBE: HCPCS | Performed by: ANESTHESIOLOGY

## 2022-02-04 PROCEDURE — U0003 INFECTIOUS AGENT DETECTION BY NUCLEIC ACID (DNA OR RNA); SEVERE ACUTE RESPIRATORY SYNDROME CORONAVIRUS 2 (SARS-COV-2) (CORONAVIRUS DISEASE [COVID-19]), AMPLIFIED PROBE TECHNIQUE, MAKING USE OF HIGH THROUGHPUT TECHNOLOGIES AS DESCRIBED BY CMS-2020-01-R: HCPCS | Performed by: ANESTHESIOLOGY

## 2022-02-05 LAB
SARS-COV-2 RNA RESP QL NAA+PROBE: NOT DETECTED
SARS-COV-2- CYCLE NUMBER: NORMAL

## 2022-02-07 ENCOUNTER — HOSPITAL ENCOUNTER (OUTPATIENT)
Facility: AMBULARY SURGERY CENTER | Age: 52
Discharge: HOME | End: 2022-02-07
Attending: ANESTHESIOLOGY | Admitting: ANESTHESIOLOGY
Payer: OTHER GOVERNMENT

## 2022-02-07 DIAGNOSIS — M47.896 OTHER SPONDYLOSIS, LUMBAR REGION: Primary | ICD-10-CM

## 2022-02-07 PROCEDURE — 64494 PR INJ DX/THER AGNT PARAVERT FACET JOINT,IMG GUIDE,LUMBAR/SAC, 2ND LEVEL: ICD-10-PCS | Mod: 50,,, | Performed by: ANESTHESIOLOGY

## 2022-02-07 PROCEDURE — 64493 PR INJ DX/THER AGNT PARAVERT FACET JOINT,IMG GUIDE,LUMBAR/SAC,1ST LVL: ICD-10-PCS | Mod: 50,,, | Performed by: ANESTHESIOLOGY

## 2022-02-07 PROCEDURE — 64494 INJ PARAVERT F JNT L/S 2 LEV: CPT | Mod: RT | Performed by: ANESTHESIOLOGY

## 2022-02-07 PROCEDURE — 64495 INJ PARAVERT F JNT L/S 3 LEV: CPT | Mod: RT | Performed by: ANESTHESIOLOGY

## 2022-02-07 PROCEDURE — 64493 INJ PARAVERT F JNT L/S 1 LEV: CPT | Mod: LT | Performed by: ANESTHESIOLOGY

## 2022-02-07 PROCEDURE — 64494 INJ PARAVERT F JNT L/S 2 LEV: CPT | Mod: 50,,, | Performed by: ANESTHESIOLOGY

## 2022-02-07 PROCEDURE — 64495 PR INJ DX/THER AGNT PARAVERT FACET JOINT,IMG GUIDE,LUMBAR/SAC, ADD LEVEL: ICD-10-PCS | Mod: 50,,, | Performed by: ANESTHESIOLOGY

## 2022-02-07 PROCEDURE — 64493 INJ PARAVERT F JNT L/S 1 LEV: CPT | Mod: 50,,, | Performed by: ANESTHESIOLOGY

## 2022-02-07 PROCEDURE — 64495 INJ PARAVERT F JNT L/S 3 LEV: CPT | Mod: 50,,, | Performed by: ANESTHESIOLOGY

## 2022-02-07 RX ORDER — SODIUM CHLORIDE, SODIUM LACTATE, POTASSIUM CHLORIDE, CALCIUM CHLORIDE 600; 310; 30; 20 MG/100ML; MG/100ML; MG/100ML; MG/100ML
INJECTION, SOLUTION INTRAVENOUS ONCE AS NEEDED
Status: COMPLETED | OUTPATIENT
Start: 2022-02-07 | End: 2022-02-07

## 2022-02-07 RX ORDER — LIDOCAINE HYDROCHLORIDE 10 MG/ML
INJECTION, SOLUTION EPIDURAL; INFILTRATION; INTRACAUDAL; PERINEURAL
Status: DISCONTINUED | OUTPATIENT
Start: 2022-02-07 | End: 2022-02-07 | Stop reason: HOSPADM

## 2022-02-07 RX ORDER — MIDAZOLAM HYDROCHLORIDE 2 MG/2ML
INJECTION, SOLUTION INTRAMUSCULAR; INTRAVENOUS
Status: DISCONTINUED | OUTPATIENT
Start: 2022-02-07 | End: 2022-02-07 | Stop reason: HOSPADM

## 2022-02-07 RX ORDER — BUPIVACAINE HYDROCHLORIDE 5 MG/ML
INJECTION, SOLUTION EPIDURAL; INTRACAUDAL
Status: DISCONTINUED | OUTPATIENT
Start: 2022-02-07 | End: 2022-02-07 | Stop reason: HOSPADM

## 2022-02-07 RX ADMIN — SODIUM CHLORIDE, SODIUM LACTATE, POTASSIUM CHLORIDE, CALCIUM CHLORIDE: 600; 310; 30; 20 INJECTION, SOLUTION INTRAVENOUS at 12:02

## 2022-02-07 NOTE — OP NOTE
PROCEDURE DATE: 2/7/2022    PROCEDURE:  Bilateral L2, L3, L4, and L5 medial branch nerve blocks    DIAGNOSIS:  Other lumbar spondylosis    Post Op diagnosis: Same    PHYSICIAN: Mj Babcock MD    MEDICATIONS INJECTED: 0.5% bupivicaine, 0.5 ml at each level    SEDATION MEDICATIONS: RN IV sedation    LOCAL ANESTHETIC USED: 1% lidocaine, 1 mL at each level    ESTIMATED BLOOD LOSS:  none    COMPLICATIONS:  none    TECHNIQUE: A time out was taken to identify the patient, procedure and side of the procedure. The patient was placed in a prone position, then prepped and draped in the usual sterile fashion using ChloraPrep and sterile towels.  The levels were determined under fluoroscopic guidance and then marked.  A 22-gauge 5 inch needle was introduced to the anatomic location of the L2, L3, L4, and L5 medial branch nerves on the bilateral side. The above medication was then injected. The patient tolerated the procedure well.     The patient was monitored after the procedure. Patient was given pain diary to record pain levels at home.     If found to have greater than a 50% recovery and so will be scheduled for a radiofrequency ablation of the corresponding nerves.  Patient was given post procedure and discharge instructions to follow at home.  The patient was discharged in a stable condition.

## 2022-02-07 NOTE — DISCHARGE INSTRUCTIONS
Before leaving, please make sure you have all your personal belongings such as glasses, purses, wallets, keys, cell phones, jewelry, jackets etc     Nerve Block  This was a test, not a treatment. Your pain may return.  Keep your pain journal Dr office will call to check your pain levels within 3 days   Perform activities that normally cause you pain during this testing period    Home care instructions  You may apply ice pack to the injection site for 2 days , NO HEAT for 2 days  You may take a shower but no soaking above level of injection in tub or pool for 2 days  Resume Aspirin, Plavix, or Coumadin the day after the procedure unless otherwise instructed.  Do not drive for 24 hr      SEEK  IMMEDIATE MEDICAL HELP FOR:  Severe increase in your usual pain or appearance of new pain  Prolonged  ( more than 8 hours)or increasing weakness or numbness in the legs or arms  Drainage, redness, active bleeding, or increased swelling at the injection site  Temperature over 100.0 degrees F.  Headache that increases when your head is upright and decreases when you lie flat  Shortness of breath, chest pain, or problems breathing      After Surgery:  Always be aware that any surgery can cause these symptoms:    Pain- After this  test, we expect your pain to decrease but  then it may return as the local anesthetic wears off. Try to NOT take your pain medicine during this testing period. Ice and rest can help with pain relief.    Bleeding- a little bleeding after a surgery is usually within normal.  If there is a lot of blood you need to notify your MD.  Emergency treatments of bleeding are cold application, elevation of the bleeding site and compression.    Infection- Infection after surgery is NOT a normal occurrence.  Signs of infection are fever, swelling, hot to touch the incision.  If this occurs notify your MD immediately.    Nausea- this can be common after a surgery especially if you have had anesthesia medicine or are  taking pain medicine.  Staying on clear liquids, bland foods, gingerale, or over the counter anti nausea medicines can help.  If you vomit more than once, notify your MD.  Anti Nausea medicines can be prescribed.

## 2022-02-07 NOTE — DISCHARGE SUMMARY
[facility]  Discharge Note  Short Stay    Procedure(s) (LRB):  Block-nerve-medial branch-lumbar L2,3,4,5 (Bilateral)    OUTCOME: Patient tolerated treatment/procedure well without complication and is now ready for discharge.    DISPOSITION: Home or Self Care    FINAL DIAGNOSIS:  Other spondylosis, lumbar    FOLLOWUP: In clinic    DISCHARGE INSTRUCTIONS:    Discharge Procedure Orders   Diet general     Call MD for:  temperature >100.4     Call MD for:  persistent nausea and vomiting     Call MD for:  severe uncontrolled pain     Call MD for:  difficulty breathing, headache or visual disturbances     Call MD for:  redness, tenderness, or signs of infection (pain, swelling, redness, odor or green/yellow discharge around incision site)     Call MD for:  hives     Call MD for:  persistent dizziness or light-headedness     Call MD for:  extreme fatigue        TIME SPENT ON DISCHARGE: 15 minutes

## 2022-02-08 ENCOUNTER — TELEPHONE (OUTPATIENT)
Dept: PAIN MEDICINE | Facility: CLINIC | Age: 52
End: 2022-02-08
Payer: OTHER GOVERNMENT

## 2022-02-08 VITALS
RESPIRATION RATE: 18 BRPM | TEMPERATURE: 98 F | DIASTOLIC BLOOD PRESSURE: 101 MMHG | SYSTOLIC BLOOD PRESSURE: 219 MMHG | BODY MASS INDEX: 38.38 KG/M2 | WEIGHT: 259.94 LBS | HEART RATE: 83 BPM | OXYGEN SATURATION: 98 %

## 2022-03-07 ENCOUNTER — PATIENT MESSAGE (OUTPATIENT)
Dept: PAIN MEDICINE | Facility: CLINIC | Age: 52
End: 2022-03-07
Payer: OTHER GOVERNMENT

## 2023-09-11 ENCOUNTER — OCCUPATIONAL HEALTH (OUTPATIENT)
Dept: URGENT CARE | Facility: CLINIC | Age: 53
End: 2023-09-11
Payer: OTHER GOVERNMENT

## 2023-09-11 DIAGNOSIS — Z13.9 ENCOUNTER FOR SCREENING: Primary | ICD-10-CM

## 2023-09-11 LAB — COLLECTION ONLY: NORMAL

## 2023-09-11 PROCEDURE — 80305 DRUG TEST PRSMV DIR OPT OBS: CPT | Mod: S$GLB,,,

## 2023-09-11 PROCEDURE — 80305 OOH COLLECTION ONLY DRUG SCREEN: ICD-10-PCS | Mod: S$GLB,,,

## 2023-09-12 ENCOUNTER — OFFICE VISIT (OUTPATIENT)
Dept: ORTHOPEDICS | Facility: CLINIC | Age: 53
End: 2023-09-12
Payer: COMMERCIAL

## 2023-09-12 ENCOUNTER — HOSPITAL ENCOUNTER (OUTPATIENT)
Dept: RADIOLOGY | Facility: HOSPITAL | Age: 53
Discharge: HOME | End: 2023-09-12
Attending: ORTHOPAEDIC SURGERY
Payer: COMMERCIAL

## 2023-09-12 VITALS — HEIGHT: 69 IN | WEIGHT: 259 LBS | BODY MASS INDEX: 38.36 KG/M2 | RESPIRATION RATE: 16 BRPM

## 2023-09-12 DIAGNOSIS — M77.02 MEDIAL EPICONDYLITIS OF LEFT ELBOW: ICD-10-CM

## 2023-09-12 DIAGNOSIS — M25.522 PAIN IN LEFT ELBOW: Primary | ICD-10-CM

## 2023-09-12 DIAGNOSIS — M25.522 LEFT ELBOW PAIN: Primary | ICD-10-CM

## 2023-09-12 DIAGNOSIS — M25.522 PAIN IN LEFT ELBOW: ICD-10-CM

## 2023-09-12 PROCEDURE — 99203 OFFICE O/P NEW LOW 30 MIN: CPT | Mod: S$PBB,,, | Performed by: ORTHOPAEDIC SURGERY

## 2023-09-12 PROCEDURE — 99203 PR OFFICE/OUTPT VISIT, NEW, LEVL III, 30-44 MIN: ICD-10-PCS | Mod: S$PBB,,, | Performed by: ORTHOPAEDIC SURGERY

## 2023-09-12 PROCEDURE — 99999 PR PBB SHADOW E&M-EST. PATIENT-LVL III: ICD-10-PCS | Mod: PBBFAC,,, | Performed by: ORTHOPAEDIC SURGERY

## 2023-09-12 PROCEDURE — 73080 XR ELBOW COMPLETE 3 VIEW LEFT: ICD-10-PCS | Mod: 26,LT,, | Performed by: RADIOLOGY

## 2023-09-12 PROCEDURE — 73080 X-RAY EXAM OF ELBOW: CPT | Mod: 26,LT,, | Performed by: RADIOLOGY

## 2023-09-12 PROCEDURE — 99999 PR PBB SHADOW E&M-EST. PATIENT-LVL III: CPT | Mod: PBBFAC,,, | Performed by: ORTHOPAEDIC SURGERY

## 2023-09-12 PROCEDURE — 73080 X-RAY EXAM OF ELBOW: CPT | Mod: TC,PO,LT

## 2023-09-12 NOTE — PROGRESS NOTES
Patient ID: Giorgi Matias is a 53 y.o. male    Chief Complaint:   Chief Complaint   Patient presents with    Left Elbow - Injury       History of Present Illness:    Pleasant 53-year-old male here for evaluation of left elbow pain.  This is a workman's compensation case.  He was at work when he felt a pop in his left elbow.  He has had pain in his medial left elbow since then and states it feels like a nerve injury.  Pain is not really improve for the past 3 weeks or so.  Has not tried any bracing or injections or physical therapy.  Denies any numbness or tingling in the ulnar 2 digits.    PAST MEDICAL HISTORY:   Past Medical History:   Diagnosis Date    Sleep apnea      PAST SURGICAL HISTORY:   Past Surgical History:   Procedure Laterality Date    CARPAL TUNNEL RELEASE Bilateral     CORRECTION OF HAMMER TOE Left 10/30/2020    Procedure: CORRECTION, HAMMER TOE;  Surgeon: Kranthi Mahoney DPM;  Location: [facility] OR;  Service: Podiatry;  Laterality: Left;  LOCAL MAC + REGIONAL  Brice Rivera notified 10/19, DEVIN kwok confirmed 10/29/2020 KB    ENDOSCOPIC PLANTAR FASCIOTOMY Left 10/30/2020    Procedure: FASCIOTOMY, PLANTAR, ENDOSCOPIC;  Surgeon: Kranthi Mahoney DPM;  Location: [facility] OR;  Service: Podiatry;  Laterality: Left;  video    INJECTION OF ANESTHETIC AGENT AROUND MEDIAL BRANCH NERVES INNERVATING LUMBAR FACET JOINT Bilateral 2/7/2022    Procedure: Block-nerve-medial branch-lumbar L2,3,4,5;  Surgeon: Mj Babcock MD;  Location: [facility] OR;  Service: Pain Management;  Laterality: Bilateral;  L2,L3,L4,L5    SHOULDER ARTHROSCOPY W/ ROTATOR CUFF REPAIR Right     SHOULDER SURGERY      SURGICAL REMOVAL OF FASCIA Left 9/24/2021    Procedure: FASCIECTOMY;  Surgeon: Kranthi Mahoney DPM;  Location: [facility] OR;  Service: Podiatry;  Laterality: Left;  Anesthesia: Regional Nerve Block  Rep:None     FAMILY HISTORY: No family history on file.  SOCIAL HISTORY:   Social History     Occupational History    Not on file   Tobacco Use     Smoking status: Never    Smokeless tobacco: Never   Substance and Sexual Activity    Alcohol use: Yes    Drug use: Never    Sexual activity: Not on file        MEDICATIONS:   Current Outpatient Medications:     amoxicillin (AMOXIL) 500 MG capsule, Take 500 mg by mouth 2 (two) times daily., Disp: , Rfl:     aspirin 81 MG Chew, Take 81 mg by mouth once daily., Disp: , Rfl:     cholecalciferol, vitamin D3, (VITAMIN D3) 50 mcg (2,000 unit) Tab, TAKE TWO TABLETS BY MOUTH EVERY DAY AS A VITAMIN SUPPLEMENT, Disp: , Rfl:     diclofenac sodium (VOLTAREN) 1 % Gel, Apply topically., Disp: , Rfl:     meloxicam (MOBIC) 15 MG tablet, Take 1 tablet (15 mg total) by mouth once daily., Disp: 90 tablet, Rfl: 0    ON-Q PAIN PUMP 400 mL Misc 1 each with ropivacaine (PF) 2 mg/ml (0.2%) 2 mg/mL (0.2 %) Soln 400 mL, by Perineural route continuous., Disp: , Rfl:     terbinafine HCL (LAMISIL) 250 mg tablet, Take 250 mg by mouth once daily., Disp: , Rfl:     traZODone (DESYREL) 50 MG tablet, , Disp: , Rfl:     mirtazapine (REMERON) 30 MG tablet, TAKE ONE TABLET BY MOUTH AT BEDTIME FOR DEPRESSION, Disp: , Rfl:     rosuvastatin (CRESTOR) 10 MG tablet, Take 1 tablet by mouth once daily., Disp: , Rfl:   ALLERGIES:   Review of patient's allergies indicates:   Allergen Reactions    Shellfish containing products Other (See Comments)     Reports reaction to crawfish and shrimp. Reports chest tightness and itching. Unknown if allergic to iodine or IV contrast Dye.         Physical Exam     Vitals:    09/12/23 1436   Resp: 16     Alert and oriented to person, place and time. No acute distress. Well-groomed, not ill appearing. Pupils round and reactive, normal respiratory effort, no audible wheezing.     On exam he has range of motion of the left elbow 10° of extension and 110° of flexion.  He has positive Tinel's of the medial epicondyle.  He has no significant pain with valgus stress in extension or flexion.  No pain over lateral epicondyle.  No pain  with resisted wrist extension or long finger extension.        Imaging:       X-Ray: I have reviewed all pertinent results/findings and my personal findings are:  Degenerative changes of the elbow with diminished joint space narrowing.  There is spurring over the medial epicondyle.      Assessment & Plan    Left elbow pain  -     MRI Elbow Joint Without Contrast Left; Future; Expected date: 09/12/2023  -     EMG W/ ULTRASOUND AND NERVE CONDUCTION TEST 1 Extremity; Future    Medial epicondylitis of left elbow         Treatment options discussed with him in detail.  He has a work injury to the left elbow with a persistent medial-sided elbow pain.  He does have positive Tinel's.  Differential here is ulnar neuritis versus subluxation of the ulnar nerve versus medial epicondylitis.  Likely constellation of all 3.  We discussed options including MRI of the left elbow and EMG to rule out ulnar nerve entrapment.  He will need to hold off from any significant weight-bearing until then.

## 2023-09-15 ENCOUNTER — TELEPHONE (OUTPATIENT)
Dept: ORTHOPEDICS | Facility: CLINIC | Age: 53
End: 2023-09-15
Payer: OTHER GOVERNMENT

## 2023-09-21 ENCOUNTER — HOSPITAL ENCOUNTER (OUTPATIENT)
Dept: RADIOLOGY | Facility: HOSPITAL | Age: 53
Discharge: HOME | End: 2023-09-21
Attending: ORTHOPAEDIC SURGERY
Payer: COMMERCIAL

## 2023-09-21 DIAGNOSIS — M25.522 LEFT ELBOW PAIN: ICD-10-CM

## 2023-09-21 PROCEDURE — 73221 MRI JOINT UPR EXTREM W/O DYE: CPT | Mod: TC,PO,LT

## 2023-10-03 ENCOUNTER — PATIENT MESSAGE (OUTPATIENT)
Dept: ORTHOPEDICS | Facility: CLINIC | Age: 53
End: 2023-10-03
Payer: OTHER GOVERNMENT

## 2023-10-06 ENCOUNTER — OFFICE VISIT (OUTPATIENT)
Dept: ORTHOPEDICS | Facility: CLINIC | Age: 53
End: 2023-10-06
Payer: COMMERCIAL

## 2023-10-06 VITALS — RESPIRATION RATE: 16 BRPM | BODY MASS INDEX: 38.36 KG/M2 | WEIGHT: 259 LBS | HEIGHT: 69 IN

## 2023-10-06 DIAGNOSIS — S53.442A ELBOW SPRAIN, ULNAR COLLATERAL LIGAMENT, LEFT, INITIAL ENCOUNTER: Primary | ICD-10-CM

## 2023-10-06 PROCEDURE — 99999 PR PBB SHADOW E&M-EST. PATIENT-LVL III: CPT | Mod: PBBFAC,,, | Performed by: ORTHOPAEDIC SURGERY

## 2023-10-06 PROCEDURE — 99999 PR PBB SHADOW E&M-EST. PATIENT-LVL III: ICD-10-PCS | Mod: PBBFAC,,, | Performed by: ORTHOPAEDIC SURGERY

## 2023-10-06 PROCEDURE — 99214 PR OFFICE/OUTPT VISIT, EST, LEVL IV, 30-39 MIN: ICD-10-PCS | Mod: S$GLB,,, | Performed by: ORTHOPAEDIC SURGERY

## 2023-10-06 PROCEDURE — 99214 OFFICE O/P EST MOD 30 MIN: CPT | Mod: S$GLB,,, | Performed by: ORTHOPAEDIC SURGERY

## 2023-10-06 NOTE — PROGRESS NOTES
Patient ID: Giorgi Matias is a 53 y.o. male    Chief Complaint:   Chief Complaint   Patient presents with    Left Elbow - Injury, Pain       History of Present Illness:    Pleasant 53-year-old male here for evaluation of left elbow pain.  This is a workman's compensation case.  He was at work when he felt a pop in his left elbow.  He has had pain in his medial left elbow since then and states it feels like a nerve injury.  Pain is not really improve for the past 3 weeks or so.  Has not tried any bracing or injections or physical therapy.  Denies any numbness or tingling in the ulnar 2 digits.    ____________________________________________________________________    Interval history 10/06/2023 : Patient returns today for MRI follow-up of their elbow.  They report no changes since I saw them last.  Still having popping sensation and pain mostly in the medial elbow especially when he turns his wrist.    PAST MEDICAL HISTORY:   Past Medical History:   Diagnosis Date    Sleep apnea      PAST SURGICAL HISTORY:   Past Surgical History:   Procedure Laterality Date    CARPAL TUNNEL RELEASE Bilateral     CORRECTION OF HAMMER TOE Left 10/30/2020    Procedure: CORRECTION, HAMMER TOE;  Surgeon: Kranthi Mahoney DPM;  Location: [facility] OR;  Service: Podiatry;  Laterality: Left;  LOCAL MAC + REGIONAL  Brice Rivera notified 10/19, DEVIN kwok confirmed 10/29/2020 KB    ENDOSCOPIC PLANTAR FASCIOTOMY Left 10/30/2020    Procedure: FASCIOTOMY, PLANTAR, ENDOSCOPIC;  Surgeon: Kranthi Mahoney DPM;  Location: [facility] OR;  Service: Podiatry;  Laterality: Left;  video    INJECTION OF ANESTHETIC AGENT AROUND MEDIAL BRANCH NERVES INNERVATING LUMBAR FACET JOINT Bilateral 2/7/2022    Procedure: Block-nerve-medial branch-lumbar L2,3,4,5;  Surgeon: Mj Babcock MD;  Location: [facility] OR;  Service: Pain Management;  Laterality: Bilateral;  L2,L3,L4,L5    SHOULDER ARTHROSCOPY W/ ROTATOR CUFF REPAIR Right     SHOULDER SURGERY      SURGICAL REMOVAL OF  FASCIA Left 9/24/2021    Procedure: FASCIECTOMY;  Surgeon: Kranthi Mahoney DPM;  Location: [facility] OR;  Service: Podiatry;  Laterality: Left;  Anesthesia: Regional Nerve Block  Rep:None     FAMILY HISTORY: No family history on file.  SOCIAL HISTORY:   Social History     Occupational History    Not on file   Tobacco Use    Smoking status: Never    Smokeless tobacco: Never   Substance and Sexual Activity    Alcohol use: Yes    Drug use: Never    Sexual activity: Not on file        MEDICATIONS:   Current Outpatient Medications:     amoxicillin (AMOXIL) 500 MG capsule, Take 500 mg by mouth 2 (two) times daily., Disp: , Rfl:     aspirin 81 MG Chew, Take 81 mg by mouth once daily., Disp: , Rfl:     cholecalciferol, vitamin D3, (VITAMIN D3) 50 mcg (2,000 unit) Tab, TAKE TWO TABLETS BY MOUTH EVERY DAY AS A VITAMIN SUPPLEMENT, Disp: , Rfl:     diclofenac sodium (VOLTAREN) 1 % Gel, Apply topically., Disp: , Rfl:     meloxicam (MOBIC) 15 MG tablet, Take 1 tablet (15 mg total) by mouth once daily., Disp: 90 tablet, Rfl: 0    ON-Q PAIN PUMP 400 mL Misc 1 each with ropivacaine (PF) 2 mg/ml (0.2%) 2 mg/mL (0.2 %) Soln 400 mL, by Perineural route continuous., Disp: , Rfl:     terbinafine HCL (LAMISIL) 250 mg tablet, Take 250 mg by mouth once daily., Disp: , Rfl:     traZODone (DESYREL) 50 MG tablet, , Disp: , Rfl:     mirtazapine (REMERON) 30 MG tablet, TAKE ONE TABLET BY MOUTH AT BEDTIME FOR DEPRESSION, Disp: , Rfl:     rosuvastatin (CRESTOR) 10 MG tablet, Take 1 tablet by mouth once daily., Disp: , Rfl:   ALLERGIES:   Review of patient's allergies indicates:   Allergen Reactions    Shellfish containing products Other (See Comments)     Reports reaction to crawfish and shrimp. Reports chest tightness and itching. Unknown if allergic to iodine or IV contrast Dye.         Physical Exam     Vitals:    10/06/23 1234   Resp: 16     Alert and oriented to person, place and time. No acute distress. Well-groomed, not ill appearing. Pupils  round and reactive, normal respiratory effort, no audible wheezing.     On exam he has range of motion of the left elbow 10° of extension and 110° of flexion.  He has positive Tinel's of the medial epicondyle.  He has no significant pain with valgus stress in extension or flexion.  No pain over lateral epicondyle.  No pain with resisted wrist extension or long finger extension.        Imaging:       X-Ray: I have reviewed all pertinent results/findings and my personal findings are:  Degenerative changes of the elbow with diminished joint space narrowing.  There is spurring over the medial epicondyle.    MRI of the left elbow reviewed showing arthrosis of the radiocapitellar joint.  There is high-grade near full-thickness tear of the posterior bundle of the ulnar collateral ligament    Assessment & Plan    Elbow sprain, ulnar collateral ligament, left, initial encounter           Treatment options discussed with him in detail.  He has a work injury to the left elbow with a persistent medial-sided elbow pain.  He does have positive Tinel's.  Awaiting EMG results next week.  MRI does show near full-thickness tear of the ulnar collateral ligament which is likely causing a lot of his pain and popping sensation.  I do think he also is having a component of neuritis of the ulnar nerve.  He will follow up after his EMG to discuss possible nonoperative and surgical options.

## 2023-10-06 NOTE — LETTER
October 6, 2023    Giorgi Matias  [address] [address]         [facility]  [facility] [address]  Phone: [phone] October 6, 2023     Patient: Giorgi Matias   YOB: 1970   Date of Visit: 10/6/2023       To Whom It May Concern:    It is my medical opinion that Giorgi Matias should remain out of work until 10/23/2023 . Mr. Matias has appointment 10/17/2032 to review test results. At that appointment, complete plan of care will be discussed.     If you have any questions or concerns, please don't hesitate to call.    Sincerely,        Jon Mauro MD

## 2023-10-11 ENCOUNTER — OFFICE VISIT (OUTPATIENT)
Dept: PHYSICAL MEDICINE AND REHAB | Facility: CLINIC | Age: 53
End: 2023-10-11
Payer: COMMERCIAL

## 2023-10-11 VITALS — BODY MASS INDEX: 38.37 KG/M2 | HEIGHT: 69 IN | WEIGHT: 259.06 LBS

## 2023-10-11 DIAGNOSIS — G56.02 LEFT CARPAL TUNNEL SYNDROME: ICD-10-CM

## 2023-10-11 DIAGNOSIS — M25.522 LEFT ELBOW PAIN: ICD-10-CM

## 2023-10-11 DIAGNOSIS — G56.22 CUBITAL TUNNEL SYNDROME ON LEFT: Primary | ICD-10-CM

## 2023-10-11 PROCEDURE — 95886 PR EMG COMPLETE, W/ NERVE CONDUCTION STUDIES, 5+ MUSCLES: ICD-10-PCS | Mod: S$GLB,,, | Performed by: PHYSICAL MEDICINE & REHABILITATION

## 2023-10-11 PROCEDURE — 99499 NO LOS: ICD-10-PCS | Mod: S$GLB,,, | Performed by: PHYSICAL MEDICINE & REHABILITATION

## 2023-10-11 PROCEDURE — 95909 NRV CNDJ TST 5-6 STUDIES: CPT | Mod: S$GLB,,, | Performed by: PHYSICAL MEDICINE & REHABILITATION

## 2023-10-11 PROCEDURE — 95886 MUSC TEST DONE W/N TEST COMP: CPT | Mod: S$GLB,,, | Performed by: PHYSICAL MEDICINE & REHABILITATION

## 2023-10-11 PROCEDURE — 99499 UNLISTED E&M SERVICE: CPT | Mod: S$GLB,,, | Performed by: PHYSICAL MEDICINE & REHABILITATION

## 2023-10-11 PROCEDURE — 99999 PR PBB SHADOW E&M-EST. PATIENT-LVL II: ICD-10-PCS | Mod: PBBFAC,,, | Performed by: PHYSICAL MEDICINE & REHABILITATION

## 2023-10-11 PROCEDURE — 95909 PR NERVE CONDUCTION STUDY; 5-6 STUDIES: ICD-10-PCS | Mod: S$GLB,,, | Performed by: PHYSICAL MEDICINE & REHABILITATION

## 2023-10-11 PROCEDURE — 99999 PR PBB SHADOW E&M-EST. PATIENT-LVL II: CPT | Mod: PBBFAC,,, | Performed by: PHYSICAL MEDICINE & REHABILITATION

## 2023-10-11 NOTE — PROGRESS NOTES
[facility]  [address]  [place] [address]             Full Name: Giorgi Matias YOB: 1970  Patient ID: [id number]  History: Left arm/elbow pain with noted numbness in hand at times. No neck pain.       Visit Date: 10/11/2023 8:57 AM  Age: 53 Years  Examining Physician: Stefani Ruvalcaba DO  Referring Physician: Jon Mauro      Sensory NCS      Nerve / Sites Rec. Site Onset Lat Peak Lat NP Amp PP Amp Segments Distance Velocity     ms ms µV µV  cm m/s   L Median - Digit III (Antidromic)      Wrist Dig III 3.33 4.10 19.1 33.4 Wrist - Dig III 14 42   L Ulnar - Digit V (Antidromic)      Wrist Dig V 3.79 4.17 2.1 6.1 Wrist - Dig V 14 37   L Radial - Anatomical snuff box (Forearm)      Forearm Wrist 1.81 2.33 17.5 21.5 Forearm - Wrist 10 55       Motor NCS      Nerve / Sites Muscle Latency Amplitude Amp % Duration Segments Distance Lat Diff Velocity     ms mV % ms  cm ms m/s   L Median - APB      Wrist APB 3.90 15.8 100 6.83 Wrist - APB 8        Elbow APB 8.00 15.0 95 6.67 Elbow - Wrist 23 4.10 56   L Ulnar - ADM      Wrist ADM 3.54 5.9 100 4.21 Wrist - ADM 8        B.Elbow ADM 9.40 3.2 55.3 8.73 B.Elbow - Wrist 28 5.85 48      A.Elbow ADM 12.19 2.0 33.7 7.77 A.Elbow - B.Elbow 10 2.79 36       EMG Summary Table     Spontaneous MUAP Recruitment   Muscle IA Fib PSW Fasc CRD Amp Dur. Poly Pattern   L. Deltoid N None None None None N N None N   L. Biceps brachii N None None None None N N None N   L. Triceps brachii N None None None None N N None N   L. Pronator teres N None None None None N N None N   L. Abductor pollicis brevis N None None None None N N None N   L. First dorsal interosseous N None None None None N N None N       Summary    The motor conduction test was performed on 2 nerve(s). The results were normal in 1 nerve(s): L Median - APB. Results outside the specified normal range were found in 1 nerve(s), as follows:  In the L Ulnar - ADM study  the take off velocity result was  reduced for B.Elbow - Wrist segment  the take off velocity result was reduced for A.Elbow - B.Elbow segment    The sensory conduction test was performed on 3 nerve(s). The results were normal in 1 nerve(s): L Radial - Anatomical snuff box (Forearm). Results outside the specified normal range were found in 2 nerve(s), as follows:  In the L Median - Digit III (Antidromic) study  the peak latency result was increased for Wrist stimulation  In the L Ulnar - Digit V (Antidromic) study  the peak latency result was increased for Wrist stimulation  the peak amplitude result was reduced for Wrist stimulation    The needle EMG study was normal in all 6 tested muscles: L. Deltoid, L. Biceps brachii, L. Triceps brachii, L. Pronator teres, L. Abductor pollicis brevis, L. First dorsal interosseous.       Impression:  Abnormal study.   Moderate left cubital tunnel syndrome, without active denervation.   Mild left carpal tunnel syndrome, without active denervation.   No electrophysiologic evidence of left cervical radiculopathy/plexopathy, or perihperal neuropathy in left upper extremity.     ------------------------------  Stefani Ruvalcaba, DO

## 2023-10-12 ENCOUNTER — TELEPHONE (OUTPATIENT)
Dept: ORTHOPEDICS | Facility: CLINIC | Age: 53
End: 2023-10-12
Payer: OTHER GOVERNMENT

## 2023-10-12 NOTE — TELEPHONE ENCOUNTER
----- Message from Soraya Jennings sent at 10/12/2023 10:31 AM CDT -----  Patient drop off a Disability form to filled out  and fax to that place.. patient requested to call Him back when its ready to  Patient #[phone]

## 2023-10-17 ENCOUNTER — OFFICE VISIT (OUTPATIENT)
Dept: ORTHOPEDICS | Facility: CLINIC | Age: 53
End: 2023-10-17
Payer: COMMERCIAL

## 2023-10-17 VITALS — RESPIRATION RATE: 17 BRPM | BODY MASS INDEX: 38.36 KG/M2 | WEIGHT: 259 LBS | HEIGHT: 69 IN

## 2023-10-17 DIAGNOSIS — G56.22 CUBITAL TUNNEL SYNDROME ON LEFT: ICD-10-CM

## 2023-10-17 DIAGNOSIS — M77.02 MEDIAL EPICONDYLITIS OF LEFT ELBOW: ICD-10-CM

## 2023-10-17 DIAGNOSIS — S53.442A ELBOW SPRAIN, ULNAR COLLATERAL LIGAMENT, LEFT, INITIAL ENCOUNTER: Primary | ICD-10-CM

## 2023-10-17 PROCEDURE — 99214 OFFICE O/P EST MOD 30 MIN: CPT | Mod: S$GLB,,, | Performed by: ORTHOPAEDIC SURGERY

## 2023-10-17 PROCEDURE — 99214 PR OFFICE/OUTPT VISIT, EST, LEVL IV, 30-39 MIN: ICD-10-PCS | Mod: S$GLB,,, | Performed by: ORTHOPAEDIC SURGERY

## 2023-10-17 PROCEDURE — 99999 PR PBB SHADOW E&M-EST. PATIENT-LVL III: CPT | Mod: PBBFAC,,, | Performed by: ORTHOPAEDIC SURGERY

## 2023-10-17 PROCEDURE — 99999 PR PBB SHADOW E&M-EST. PATIENT-LVL III: ICD-10-PCS | Mod: PBBFAC,,, | Performed by: ORTHOPAEDIC SURGERY

## 2023-10-17 NOTE — LETTER
October 17, 2023    Giorgi Matias  [address] [facility] [address]         [facility]  [facility] [address]  Phone: [phone] October 17, 2023     Patient: Giorgi Matias   YOB: 1970   Date of Visit: 10/17/2023       To Whom It May Concern:    It is my medical opinion that Giorgi Matias may return to work on 10/23/2013 light duty with the following restrictions: no lift/carry/push/pull over 15 lbs . Mr. Matias to be allowed intermittent absences for physical therapy up to three times a week for an hour a session for up to 12 weeks.   Next scheduled appointment 11/28/2023 for work status update.     If you have any questions or concerns, please don't hesitate to call.    Sincerely,        Jon Mauro MD

## 2023-10-18 NOTE — PROGRESS NOTES
Patient ID: Giorgi Matias is a 53 y.o. male    Chief Complaint:   Chief Complaint   Patient presents with    Left Elbow - Injury, Pain       History of Present Illness:    Pleasant 53-year-old male here for evaluation of left elbow pain.  This is a workman's compensation case.  He was at work when he felt a pop in his left elbow.  He has had pain in his medial left elbow since then and states it feels like a nerve injury.  Pain is not really improve for the past 3 weeks or so.  Has not tried any bracing or injections or physical therapy.  Denies any numbness or tingling in the ulnar 2 digits.    ____________________________________________________________________    Interval history 10/18/2023 : Patient returns today for MRI and EMG follow-up of their elbow.  They report no changes since I saw them last.  Still having popping sensation and pain mostly in the medial elbow especially when he turns his wrist.    PAST MEDICAL HISTORY:   Past Medical History:   Diagnosis Date    Sleep apnea      PAST SURGICAL HISTORY:   Past Surgical History:   Procedure Laterality Date    CARPAL TUNNEL RELEASE Bilateral     CORRECTION OF HAMMER TOE Left 10/30/2020    Procedure: CORRECTION, HAMMER TOE;  Surgeon: Kranthi Mahoney DPM;  Location: [facility] OR;  Service: Podiatry;  Laterality: Left;  LOCAL MAC + REGIONAL  Brice Rivera notified 10/19, DEVIN kwok confirmed 10/29/2020 KB    ENDOSCOPIC PLANTAR FASCIOTOMY Left 10/30/2020    Procedure: FASCIOTOMY, PLANTAR, ENDOSCOPIC;  Surgeon: Kranthi Mahoney DPM;  Location: [facility] OR;  Service: Podiatry;  Laterality: Left;  video    INJECTION OF ANESTHETIC AGENT AROUND MEDIAL BRANCH NERVES INNERVATING LUMBAR FACET JOINT Bilateral 2/7/2022    Procedure: Block-nerve-medial branch-lumbar L2,3,4,5;  Surgeon: Mj Babcock MD;  Location: [facility] OR;  Service: Pain Management;  Laterality: Bilateral;  L2,L3,L4,L5    SHOULDER ARTHROSCOPY W/ ROTATOR CUFF REPAIR Right     SHOULDER SURGERY      SURGICAL REMOVAL  OF FASCIA Left 9/24/2021    Procedure: FASCIECTOMY;  Surgeon: Kranthi Mahoney DPM;  Location: [facility] OR;  Service: Podiatry;  Laterality: Left;  Anesthesia: Regional Nerve Block  Rep:None     FAMILY HISTORY: No family history on file.  SOCIAL HISTORY:   Social History     Occupational History    Not on file   Tobacco Use    Smoking status: Never    Smokeless tobacco: Never   Substance and Sexual Activity    Alcohol use: Yes    Drug use: Never    Sexual activity: Not on file        MEDICATIONS:   Current Outpatient Medications:     amoxicillin (AMOXIL) 500 MG capsule, Take 500 mg by mouth 2 (two) times daily., Disp: , Rfl:     aspirin 81 MG Chew, Take 81 mg by mouth once daily., Disp: , Rfl:     cholecalciferol, vitamin D3, (VITAMIN D3) 50 mcg (2,000 unit) Tab, TAKE TWO TABLETS BY MOUTH EVERY DAY AS A VITAMIN SUPPLEMENT, Disp: , Rfl:     diclofenac sodium (VOLTAREN) 1 % Gel, Apply topically., Disp: , Rfl:     meloxicam (MOBIC) 15 MG tablet, Take 1 tablet (15 mg total) by mouth once daily., Disp: 90 tablet, Rfl: 0    ON-Q PAIN PUMP 400 mL Misc 1 each with ropivacaine (PF) 2 mg/ml (0.2%) 2 mg/mL (0.2 %) Soln 400 mL, by Perineural route continuous., Disp: , Rfl:     terbinafine HCL (LAMISIL) 250 mg tablet, Take 250 mg by mouth once daily., Disp: , Rfl:     traZODone (DESYREL) 50 MG tablet, , Disp: , Rfl:     mirtazapine (REMERON) 30 MG tablet, TAKE ONE TABLET BY MOUTH AT BEDTIME FOR DEPRESSION, Disp: , Rfl:     rosuvastatin (CRESTOR) 10 MG tablet, Take 1 tablet by mouth once daily., Disp: , Rfl:   ALLERGIES:   Review of patient's allergies indicates:   Allergen Reactions    Shellfish containing products Other (See Comments)     Reports reaction to crawfish and shrimp. Reports chest tightness and itching. Unknown if allergic to iodine or IV contrast Dye.         Physical Exam     Vitals:    10/17/23 1305   Resp: 17     Alert and oriented to person, place and time. No acute distress. Well-groomed, not ill appearing.  Pupils round and reactive, normal respiratory effort, no audible wheezing.     On exam he has range of motion of the left elbow 10° of extension and 110° of flexion.  He has positive Tinel's of the medial epicondyle.  He has no significant pain with valgus stress in extension or flexion.  No pain over lateral epicondyle.  No pain with resisted wrist extension or long finger extension.        Imaging:       X-Ray: I have reviewed all pertinent results/findings and my personal findings are:  Degenerative changes of the elbow with diminished joint space narrowing.  There is spurring over the medial epicondyle.    MRI of the left elbow reviewed showing arthrosis of the radiocapitellar joint.  There is high-grade near full-thickness tear of the posterior bundle of the ulnar collateral ligament    EMG of the left elbow confirms moderate cubital tunnel syndrome without denervation    Assessment & Plan    Elbow sprain, ulnar collateral ligament, left, initial encounter  -     Ambulatory referral/consult to Physical/Occupational Therapy; Future; Expected date: 10/24/2023  -     ORTHOTIC DEVICE (DME)    Medial epicondylitis of left elbow  -     Ambulatory referral/consult to Physical/Occupational Therapy; Future; Expected date: 10/24/2023  -     ORTHOTIC DEVICE (DME)    Cubital tunnel syndrome on left           Treatment options discussed with him in detail.  He has a work injury to the left elbow with a persistent medial-sided elbow pain.  He does have positive Tinel's.  EMG does confirm cubital tunnel syndrome without denervation.  MRI does confirm near full-thickness rupture of the posterior band of the ulnar collateral ligament.  His elbow does feel stable on clinical exam.  I do think the ulnar collateral ligament can be treated conservatively.  We also discussed operative options for this including cubital tunnel release with possible ulnar nerve transposition and possible repair of the ulnar collateral ligament we  using a graft if necessary.  We discussed the rehabilitation associated with surgery as well as the expectations of surgery.  Alternatively we discussed trial of nonoperative treatment with formal physical therapy and a nighttime extension splint.  He would like to try nonoperative treatment before pursuing any sort of surgical intervention which is certainly reasonable.  We will follow up with him in 4-6 weeks and see how he is doing.  We will need to be on light duty for work with 5-10 lb weight restriction and no heavy lifting, twisting or gripping                 Name band;

## 2023-10-25 ENCOUNTER — TELEPHONE (OUTPATIENT)
Dept: ORTHOPEDICS | Facility: CLINIC | Age: 53
End: 2023-10-25
Payer: OTHER GOVERNMENT

## 2023-10-25 NOTE — TELEPHONE ENCOUNTER
----- Message from Dee Handley sent at 10/25/2023  8:12 AM CDT -----  Regarding: Short term dis. paperwork  Patient stopped by to drop off correct S.T.D paperwork instructed by [employer]. Physician begin filling out form on page 2 only. Please fax to [phone] attention to [employer] once complete. Thanks

## 2023-10-30 ENCOUNTER — TELEPHONE (OUTPATIENT)
Dept: ORTHOPEDICS | Facility: CLINIC | Age: 53
End: 2023-10-30
Payer: OTHER GOVERNMENT

## 2023-10-30 NOTE — TELEPHONE ENCOUNTER
----- Message from Sung Joseph sent at 10/30/2023  3:25 PM CDT -----  Regarding: wants to speak to Roberta about paperwork, call pt   Contact: pt   wants to speak to Roberta about paperwork, call pt

## 2023-10-30 NOTE — TELEPHONE ENCOUNTER
----- Message from Ariana Cloud MA sent at 10/30/2023  1:19 PM CDT -----  Contact: pt  Calling on paperwork, faxed to short term disability   Call back

## 2023-10-30 NOTE — TELEPHONE ENCOUNTER
Spoke to Short term disability company . Gave e-mail address. They will be sending needed forms there for us to fill out.

## 2023-11-06 ENCOUNTER — TELEPHONE (OUTPATIENT)
Dept: ORTHOPEDICS | Facility: CLINIC | Age: 53
End: 2023-11-06
Payer: OTHER GOVERNMENT

## 2023-11-06 ENCOUNTER — PATIENT MESSAGE (OUTPATIENT)
Dept: ORTHOPEDICS | Facility: CLINIC | Age: 53
End: 2023-11-06
Payer: OTHER GOVERNMENT

## 2023-11-06 NOTE — TELEPHONE ENCOUNTER
----- Message from Ghazal Douglas LPN sent at 11/6/2023 11:55 AM CST -----  Regarding: Paperwork  Patient asking for paperwork that was faxed to the office to be filled out.

## 2023-11-07 ENCOUNTER — PATIENT MESSAGE (OUTPATIENT)
Dept: ORTHOPEDICS | Facility: CLINIC | Age: 53
End: 2023-11-07
Payer: OTHER GOVERNMENT

## 2023-11-07 ENCOUNTER — CLINICAL SUPPORT (OUTPATIENT)
Dept: REHABILITATION | Facility: HOSPITAL | Age: 53
End: 2023-11-07
Attending: ORTHOPAEDIC SURGERY
Payer: COMMERCIAL

## 2023-11-07 DIAGNOSIS — M77.02 MEDIAL EPICONDYLITIS OF LEFT ELBOW: ICD-10-CM

## 2023-11-07 DIAGNOSIS — M25.522 LEFT ELBOW PAIN: Primary | ICD-10-CM

## 2023-11-07 DIAGNOSIS — S53.442A ELBOW SPRAIN, ULNAR COLLATERAL LIGAMENT, LEFT, INITIAL ENCOUNTER: ICD-10-CM

## 2023-11-07 DIAGNOSIS — M25.622 ELBOW STIFFNESS, LEFT: ICD-10-CM

## 2023-11-07 PROCEDURE — 97165 OT EVAL LOW COMPLEX 30 MIN: CPT | Mod: PN

## 2023-11-07 NOTE — PATIENT INSTRUCTIONS
home program 11-6-23  -avoid pressure on inside of elbow  -light painless nonrepetitive use only  -follow referral source's lead on any activity restrictions  -wear brace per referral source's instruction  -do below exercise one time, at least 2 x day, at least a 10 minute hold, mild discomfort only  *with left elbow resting on rolled up towel and forearm turned up, use right hand to push left elbow straight

## 2023-11-07 NOTE — PLAN OF CARE
[facility] Occupational Therapy  Initial Evaluation     Date: 11/7/2023  Name: Giorgi Matias  Clinic Number: [id number]    Therapy Diagnosis:   Encounter Diagnoses   Name Primary?    Elbow sprain, ulnar collateral ligament, left, initial encounter     Medial epicondylitis of left elbow     Left elbow pain Yes    Elbow stiffness, left      Physician: Jon Mauro MD    Physician Orders: evaluate and tx, see epic  Medical Diagnosis: left elbow ulnar collateral ligament sprain, medial epicondylitis (cubital tunnel syndrome in last referral source's note but not on order)  Surgical Procedure and Date: n/a, n/a  Evaluation Date: 11/7/2023  Insurance Authorization Period Expiration: referral [id number] 10-25-23 thru 10-16-24              Plan of Care Certification Period: 11-7-23 thru 1-30-24  Date of Return to referral source's office: see epic    Visit # / Visits authorized: 1 / 12 referral [id number] 10-25-23 thru 10-16-24  Time In:820  Time Out: 850  Total Billable Time: 15 minutes    Precautions:  universal, see epic, protocol if applicable  Subjective     Involved Side: left  Dominant Side: right  Date of Onset: see epic  History of Current Condition/Mechanism of Injury: injured while moving tire at work, says saw urgent care, seen by referring md multiple times; had had xray and mri, therapy ordered recently  Imaging: see epic  Previous Therapy: none for above diagnoses    Past Medical History/Physical Systems Review:   Giorgi Matias  has a past medical history of Sleep apnea.    Giorgi Matias  has a past surgical history that includes Shoulder surgery; Carpal tunnel release (Bilateral); Shoulder arthroscopy w/ rotator cuff repair (Right); Correction of hammer toe (Left, 10/30/2020); Endoscopic plantar fasciotomy (Left, 10/30/2020); Surgical removal of fascia (Left, 9/24/2021); and Injection of anesthetic agent around medial branch nerves innervating lumbar facet joint (Bilateral,  2/7/2022).    Giorgi has a current medication list which includes the following prescription(s): amoxicillin, aspirin, cholecalciferol (vitamin d3), diclofenac sodium, meloxicam, mirtazapine, ON-Q PAIN PUMP 400 mL Misc 1 each with ropivacaine (PF) 2 mg/ml (0.2%) 2 mg/mL (0.2 %) Soln 400 mL, rosuvastatin, terbinafine hcl, and trazodone.    Review of patient's allergies indicates:   Allergen Reactions    Shellfish containing products Other (See Comments)     Reports reaction to crawfish and shrimp. Reports chest tightness and itching. Unknown if allergic to iodine or IV contrast Dye.        Patient's Goals for Therapy: full painless use    Pain:  Functional Pain Scale Rating 0-10:   4/10 on average  0/10 at best  8/10 at worst  Side:left  Location: diffuse medial elbow  Description: pop, sore  Aggravating Factors: some use  Easing Factors: rest  Occupation:  delivers tires  Working presently: no  Duties: per job if working; typical self and home care    Functional Limitations/Social History:    Previous functional status includes: Independent with all ADLs.     Current Functional Status   Home/Living environment : lives with spouse    Limitation of Functional Status as follows: decreased motion, use, and strength with ADL   ADLs/IADLs:               See above   Leisure: not tested  pain meds: OTC  nicotine: denies  caffeine: denies    Objective   Posture:wnl  Palpation:not tested  Sensation:intermittent numbness medial forearm thru volar RF, SF  ROM:    Prom                                right             left  Elbow ext/flex                  5/130           20/130  Sup/pro elbow at 90        70/80           70/80          Edema:circumferential     right 34.0 cm left 33.0 cm  DME:says got cubital tunnel ext splint from referral source he wears at night          CMS Impairment/Limitation/Restriction for FOTO Survey    Will do next visit               Treatment     Treatment Time In: 835  Treatment Time Out: 850  Total  Treatment time separate from Evaluation time:15    Giorgi received therapeutic exercises for 15 minutes including:  -see above and/or media section                    Home Exercise Program/Education:  Issued HEP (see patient instructions in EMR in media or note) . Exercises were reviewed and Giorgi was able to demonstrate them prior to the end of the session.   Pt received a written copy of exercises to perform at home. Giorgi demonstrated good understanding of the education provided.  Pt was advised to perform these exercises free of pain, and to stop performing them if pain occurs.    Patient/Family Education: role of OT, goals for OT, scheduling/cancellations - pt verbalized understanding. Discussed insurance limitations with patient.    Additional Education provided: likely tx progression, expectations of rehab    Assessment     Giorgi Matias is a 53 y.o. male referred to outpatient occupational therapy and presents with a medical diagnosis of see above resulting in Decreased ROM, Increased pain, Edema, Joint Stiffness, and Diminished/Impaired Sensation and demonstrates limitations as described in the chart below. Following medical record review it is determined that pt will benefit from occupational therapy services in order to maximize pain free and/or functional use of left elbow. The following goals were discussed with the patient and patient is in agreement with them as to be addressed in the treatment plan. The patient's rehab potential is good.     Anticipated barriers to occupational therapy: pain, stiffness  Pt has no cultural, educational or language barriers to learning provided.    Profile and History Assessment of Occupational Performance Level of Clinical Decision Making Complexity Score   Occupational Profile:   Giorgi Matias is a 53 y.o. male who lives with their spouse and is currently employed Giorgi Matias has difficulty with  ADLs and IADLs as listed previously, which  affecting his/her daily  functional abilities.      Comorbidities:    has a past medical history of Sleep apnea.    Medical and Therapy History Review:   Brief               Performance Deficits    Physical:  Joint Mobility  Edema  Pain    Cognitive:  No Deficits    Psychosocial:    No Deficits     Clinical Decision Making:  low    Assessment Process:  Problem-Focused Assessments    Modification/Need for Assistance:  Not Necessary    Intervention Selection:  Limited Treatment Options       low  Based on PMHX, co morbidities , data from assessments and functional level of assistance required with task and clinical presentation directly impacting function.           The following goals were discussed with the patient and patient is in agreement with them as to be addressed in the treatment plan.     Goals:   Short term goals to be met in 4 weeks 1. Patient will be I with HEP 2. Patient will have 2/10 pain with light use 3. Patient will have = prom of bilateral elbows to enhance affected arm use with ADL      Long term goals to be met by d/c 1. Patient will be I with d/c HEP 2. Patient will have 1/10 pain with light use 3. Patient will have about 75% /pinch on affected side vs unaffected side to promote full functional use                                                                 4. Patient will be I with all light ADL      all goals ongoing unless noted above or met today or in past but not noted yet    Plan   Certification Period/Plan of care expiration: 11/7/2023 to             1-     Outpatient Occupational Therapy  2 times weekly for 12 weeks to include the following interventions: eval and tx    Above frequency and duration in above dates may change based on patient progress and need for therapy    Paul GRANT CHT

## 2023-11-09 NOTE — TELEPHONE ENCOUNTER
Pt advised that we have yet to received e-mail or fax from insurance/ short term disability company.

## 2023-11-13 ENCOUNTER — CLINICAL SUPPORT (OUTPATIENT)
Dept: REHABILITATION | Facility: HOSPITAL | Age: 53
End: 2023-11-13
Payer: COMMERCIAL

## 2023-11-13 DIAGNOSIS — M25.522 LEFT ELBOW PAIN: Primary | ICD-10-CM

## 2023-11-13 DIAGNOSIS — M25.622 ELBOW STIFFNESS, LEFT: ICD-10-CM

## 2023-11-13 PROCEDURE — 97035 APP MDLTY 1+ULTRASOUND EA 15: CPT | Mod: PN

## 2023-11-13 PROCEDURE — 97022 WHIRLPOOL THERAPY: CPT | Mod: PN

## 2023-11-13 PROCEDURE — 97140 MANUAL THERAPY 1/> REGIONS: CPT | Mod: PN

## 2023-11-13 NOTE — PROGRESS NOTES
Occupational Therapy Daily Treatment Note     Date: 11/13/2023  Name: Giorgi Matias  [facility] Number: [id number]    Therapy Diagnosis:   Encounter Diagnoses   Name Primary?    Left elbow pain Yes    Elbow stiffness, left      Physician: Jon Mauro MD        Physician Orders: evaluate and tx, see epic  Medical Diagnosis: left elbow ulnar collateral ligament sprain, medial epicondylitis (cubital tunnel syndrome in last referral source's note but not on order)  Surgical Procedure and Date: n/a, n/a  Evaluation Date: 11/7/2023  Insurance Authorization Period Expiration: see epic             Plan of Care Certification Period: 11-7-23 thru 1-30-24  Date of Return to referral source's office: see epic     Visit # / Visits authorized: see epic    Time In:115  Time Out: 2  Total Billable Time: 45 minutes    Precautions:  universal, see epic, protocol if applicable    Subjective     Pt reports: no new issues  he is compliant with home exercise program.  Response to previous treatment:good  Functional change: none stated    Pain: 0/10 rest; up to 8/10 light use  Side:left  Location: medial elbow      diffuse    pop  Objective       Timed units:  ultrasound                            time:130-145  manual                            time: 145-2    Untimed units:  fluidotherapy                           time:115-130      Measurements/special tests/observations: prom elbow ext right 5 left 10        Fluidotherapy: 15 min.    Ultrasound:    Location: common flexor tendon    15'   2.5 cm wide head             3.3 megahertz         50% pulsed   0.8 swain/centimeter squared      Upper body ergometer: n/a    Scar massage: n/a    Stretches as tolerated-pain free: n/a      Manual: tfm common flexor tendon    Range of motion: n/a    Progressive resistive exercise: n/a      Home exercise program: see above and/or below    Home Exercises and Education Provided     Education provided:       Rationale for above tx          progress  towards goals   likely treatment progression  rationale of rehab interventions    Written Home Exercises/Information Provided:  no .        previously issued exercises and/or other issued home therapy instructions were reviewed if still part of current treatment plan as well as any issued today and Giorgi was able to demonstrate them prior to the end of the session.  Giorgi demonstrated good understanding of the HEP provided.     See EMR under patient instructions and/or media for HEP issued today or in past    Assessment             Pt would continue to benefit from skilled occupational therapy in order to facilitate strong, painless, and full use.    Giorgi is progressing well towards his goals and there are no updates to goals at this time unless goals noted below are different than goals on previous notes or evaluation. Pt prognosis is good  Pt will continue to benefit from skilled outpatient occupational therapy to address the deficits listed in the problem list on initial evaluation to provide pt/family education and to maximize pt's level of independence in the home and community environment.     Anticipated barriers to occupational therapy: pain, stiffness    Pt's spiritual, cultural and educational needs considered and pt agreeable to plan of care and goals.    Goals:    Short term goals to be met in 4 weeks 1. Patient will be I with HEP 2. Patient will have 2/10 pain with light use 3. Patient will have = prom of bilateral elbows to enhance affected arm use with ADL        Long term goals to be met by d/c 1. Patient will be I with d/c HEP 2. Patient will have 1/10 pain with light use 3. Patient will have about 75% /pinch on affected side vs unaffected side to promote full functional use                                                                 4. Patient will be I with all light ADL       all goals ongoing unless noted above or met today or in past but not noted yet            Any goals met today ?   (if any met see above)    Updates/Grading for next session: as needed    Plan: evaluate and treat    Paul GRANT CHT

## 2023-11-16 ENCOUNTER — TELEPHONE (OUTPATIENT)
Dept: REHABILITATION | Facility: HOSPITAL | Age: 53
End: 2023-11-16
Payer: OTHER GOVERNMENT

## 2023-11-16 ENCOUNTER — CLINICAL SUPPORT (OUTPATIENT)
Dept: REHABILITATION | Facility: HOSPITAL | Age: 53
End: 2023-11-16
Payer: COMMERCIAL

## 2023-11-16 DIAGNOSIS — M25.522 LEFT ELBOW PAIN: Primary | ICD-10-CM

## 2023-11-16 DIAGNOSIS — M25.622 ELBOW STIFFNESS, LEFT: ICD-10-CM

## 2023-11-16 PROCEDURE — 97110 THERAPEUTIC EXERCISES: CPT | Mod: PN

## 2023-11-16 NOTE — PATIENT INSTRUCTIONS
current routine 11-16-23   -do below exercise 2 sets of 30 every day  -with left elbow straight, forearm turned up, and wrist straight use right hand to push straight RF, SF down and lift back up  -contact referring provider about seeing them soon

## 2023-11-16 NOTE — PROGRESS NOTES
Occupational Therapy Daily Treatment Note     Date: 11/16/2023  Name: Giorgi Matias  [facility] Number: [id number]    Therapy Diagnosis:   Encounter Diagnoses   Name Primary?    Left elbow pain Yes    Elbow stiffness, left      Physician: Jon Mauro MD        Physician Orders: evaluate and tx, see epic  Medical Diagnosis: left elbow ulnar collateral ligament sprain, medial epicondylitis (cubital tunnel syndrome in last referral source's note but not on order)  Surgical Procedure and Date: n/a, n/a  Evaluation Date: 11/7/2023  Insurance Authorization Period Expiration: see epic             Plan of Care Certification Period: 11-7-23 thru 1-30-24  Date of Return to referral source's office: see epic     Visit # / Visits authorized: see epic    Time In:1  Time Out: 130  Total Billable Time: 30 minutes    Precautions:  universal, see epic, protocol if applicable    Subjective     Pt reports: an understanding to see referring provider soon  he is compliant with home exercise program.  Response to previous treatment:good  Functional change: none stated    Pain: 0/10 rest; up to 8/10 light use  Side:left  Location: medial elbow      diffuse    pop  Objective       Timed units:  1 therapeutic exercise               time:115-130        Measurements/special tests/observations:     prom elbow ext right 5 left 5  Resisted wrist pf with elbow at 0 and forearm in full pro (-)  Palpation at common flexor origin, tendon, and mt junction all (-)        Fluidotherapy: n/a    Ultrasound:    Location: common flexor tendon    N/a    Upper body ergometer: n/a    Scar massage: n/a    Stretches as tolerated-pain free: n/a      Manual: n/a    Range of motion: per today's HEP    Progressive resistive exercise: n/a      Home exercise program: see above and/or below    Home Exercises and Education Provided     Education provided:       Explained current intervention for cubital tunnel syndrome is the extent of what I think can be  offered    Explained his golfer's elbow seems to have resolved    Explained I am unaware of any rehab intervention to address the medial ligament complex issue    Explained to see referring provider soon to discuss the consistent painful popping on medial elbow    Explained I will hold off on discharging him until I see what ortho's take on his symptoms is per future chart review        progress towards goals   likely treatment progression  rationale of rehab interventions    Written Home Exercises/Information Provided: yes        previously issued exercises and/or other issued home therapy instructions were reviewed if still part of current treatment plan as well as any issued today and Giorgi was able to demonstrate them prior to the end of the session.  Giorgi demonstrated good understanding of the HEP provided.     See EMR under patient instructions and/or media for HEP issued today or in past    Assessment       See above    Info written below and above anticipated barriers does not apply as of 11-16-23    Pt would continue to benefit from skilled occupational therapy in order to facilitate strong, painless, and full use.    Giorgi is progressing well towards his goals and there are no updates to goals at this time unless goals noted below are different than goals on previous notes or evaluation. Pt prognosis is good  Pt will continue to benefit from skilled outpatient occupational therapy to address the deficits listed in the problem list on initial evaluation to provide pt/family education and to maximize pt's level of independence in the home and community environment.     Anticipated barriers to occupational therapy: pain, stiffness    Pt's spiritual, cultural and educational needs considered and pt agreeable to plan of care and goals.    Goals:    Short term goals to be met in 4 weeks 1. Patient will be I with HEP 2. Patient will have 2/10 pain with light use 3. Patient will have = prom of bilateral elbows to  enhance affected arm use with ADL        Long term goals to be met by d/c 1. Patient will be I with d/c HEP 2. Patient will have 1/10 pain with light use 3. Patient will have about 75% /pinch on affected side vs unaffected side to promote full functional use                                                                 4. Patient will be I with all light ADL       all goals ongoing unless noted above or met today or in past but not noted yet            Any goals met today ?  (if any met see above)    Updates/Grading for next session: as needed    Plan: evaluate and treat    Paul GRANT CHT

## 2023-11-28 ENCOUNTER — OFFICE VISIT (OUTPATIENT)
Dept: ORTHOPEDICS | Facility: CLINIC | Age: 53
End: 2023-11-28
Payer: COMMERCIAL

## 2023-11-28 ENCOUNTER — DOCUMENTATION ONLY (OUTPATIENT)
Dept: REHABILITATION | Facility: HOSPITAL | Age: 53
End: 2023-11-28
Payer: OTHER GOVERNMENT

## 2023-11-28 ENCOUNTER — TELEPHONE (OUTPATIENT)
Dept: ORTHOPEDICS | Facility: CLINIC | Age: 53
End: 2023-11-28
Payer: OTHER GOVERNMENT

## 2023-11-28 VITALS — WEIGHT: 259 LBS | BODY MASS INDEX: 38.36 KG/M2 | RESPIRATION RATE: 16 BRPM | HEIGHT: 69 IN

## 2023-11-28 DIAGNOSIS — M77.02 MEDIAL EPICONDYLITIS OF LEFT ELBOW: Primary | ICD-10-CM

## 2023-11-28 DIAGNOSIS — G56.22 CUBITAL TUNNEL SYNDROME ON LEFT: ICD-10-CM

## 2023-11-28 PROCEDURE — 99214 OFFICE O/P EST MOD 30 MIN: CPT | Mod: S$GLB,,, | Performed by: ORTHOPAEDIC SURGERY

## 2023-11-28 PROCEDURE — 99999 PR PBB SHADOW E&M-EST. PATIENT-LVL II: CPT | Mod: PBBFAC,,, | Performed by: ORTHOPAEDIC SURGERY

## 2023-11-28 PROCEDURE — 99999 PR PBB SHADOW E&M-EST. PATIENT-LVL II: ICD-10-PCS | Mod: PBBFAC,,, | Performed by: ORTHOPAEDIC SURGERY

## 2023-11-28 PROCEDURE — 99214 PR OFFICE/OUTPT VISIT, EST, LEVL IV, 30-39 MIN: ICD-10-PCS | Mod: S$GLB,,, | Performed by: ORTHOPAEDIC SURGERY

## 2023-11-28 NOTE — PROGRESS NOTES
Patient ID: Giorgi Matias is a 53 y.o. male    Chief Complaint:   Chief Complaint   Patient presents with    Left Elbow - Pain       History of Present Illness:    Pleasant 53-year-old male here for evaluation of left elbow pain.  This is a workman's compensation case.  He was at work when he felt a pop in his left elbow.  He has had pain in his medial left elbow since then and states it feels like a nerve injury.  Pain is not really improve for the past 3 weeks or so.  Has not tried any bracing or injections or physical therapy.  Denies any numbness or tingling in the ulnar 2 digits.    ____________________________________________________________________    Interval history 11/28/2023 : Patient returns today for follow up of his left elbow.  Pain treated non operatively for ulnar nerve symptoms as well as ligament injury to his elbow.  Currently in physical therapy and wearing nighttime splints.  Reports continued pain numbness.    PAST MEDICAL HISTORY:   Past Medical History:   Diagnosis Date    Sleep apnea      PAST SURGICAL HISTORY:   Past Surgical History:   Procedure Laterality Date    CARPAL TUNNEL RELEASE Bilateral     CORRECTION OF HAMMER TOE Left 10/30/2020    Procedure: CORRECTION, HAMMER TOE;  Surgeon: Kranthi Mahoney DPM;  Location: [facility] OR;  Service: Podiatry;  Laterality: Left;  LOCAL MAC + REGIONAL  Brice Rivera notified 10/19, DEVIN kwok confirmed 10/29/2020 KB    ENDOSCOPIC PLANTAR FASCIOTOMY Left 10/30/2020    Procedure: FASCIOTOMY, PLANTAR, ENDOSCOPIC;  Surgeon: Kranthi Mahoney DPM;  Location: [facility] OR;  Service: Podiatry;  Laterality: Left;  video    INJECTION OF ANESTHETIC AGENT AROUND MEDIAL BRANCH NERVES INNERVATING LUMBAR FACET JOINT Bilateral 2/7/2022    Procedure: Block-nerve-medial branch-lumbar L2,3,4,5;  Surgeon: Mj Babcock MD;  Location: [facility] OR;  Service: Pain Management;  Laterality: Bilateral;  L2,L3,L4,L5    SHOULDER ARTHROSCOPY W/ ROTATOR CUFF REPAIR Right     SHOULDER  SURGERY      SURGICAL REMOVAL OF FASCIA Left 9/24/2021    Procedure: FASCIECTOMY;  Surgeon: Kranthi Mahoney DPM;  Location: [facility];  Service: Podiatry;  Laterality: Left;  Anesthesia: Regional Nerve Block  Rep:None     FAMILY HISTORY: No family history on file.  SOCIAL HISTORY:   Social History     Occupational History    Not on file   Tobacco Use    Smoking status: Never    Smokeless tobacco: Never   Substance and Sexual Activity    Alcohol use: Yes    Drug use: Never    Sexual activity: Not on file        MEDICATIONS:   Current Outpatient Medications:     amoxicillin (AMOXIL) 500 MG capsule, Take 500 mg by mouth 2 (two) times daily., Disp: , Rfl:     aspirin 81 MG Chew, Take 81 mg by mouth once daily., Disp: , Rfl:     cholecalciferol, vitamin D3, (VITAMIN D3) 50 mcg (2,000 unit) Tab, TAKE TWO TABLETS BY MOUTH EVERY DAY AS A VITAMIN SUPPLEMENT, Disp: , Rfl:     diclofenac sodium (VOLTAREN) 1 % Gel, Apply topically., Disp: , Rfl:     meloxicam (MOBIC) 15 MG tablet, Take 1 tablet (15 mg total) by mouth once daily., Disp: 90 tablet, Rfl: 0    ON-Q PAIN PUMP 400 mL Misc 1 each with ropivacaine (PF) 2 mg/ml (0.2%) 2 mg/mL (0.2 %) Soln 400 mL, by Perineural route continuous., Disp: , Rfl:     terbinafine HCL (LAMISIL) 250 mg tablet, Take 250 mg by mouth once daily., Disp: , Rfl:     traZODone (DESYREL) 50 MG tablet, , Disp: , Rfl:     mirtazapine (REMERON) 30 MG tablet, TAKE ONE TABLET BY MOUTH AT BEDTIME FOR DEPRESSION, Disp: , Rfl:     rosuvastatin (CRESTOR) 10 MG tablet, Take 1 tablet by mouth once daily., Disp: , Rfl:   ALLERGIES:   Review of patient's allergies indicates:   Allergen Reactions    Shellfish containing products Other (See Comments)     Reports reaction to crawfish and shrimp. Reports chest tightness and itching. Unknown if allergic to iodine or IV contrast Dye.         Physical Exam     Vitals:    11/28/23 1240   Resp: 16     Alert and oriented to person, place and time. No acute distress.  Well-groomed, not ill appearing. Pupils round and reactive, normal respiratory effort, no audible wheezing.     On exam he has range of motion of the left elbow 10° of extension and 110° of flexion.  He has positive Tinel's of the medial epicondyle.  He has no significant pain with valgus stress in extension or flexion.  No pain over lateral epicondyle.  No pain with resisted wrist extension or long finger extension.        Imaging:       X-Ray: I have reviewed all pertinent results/findings and my personal findings are:  Degenerative changes of the elbow with diminished joint space narrowing.  There is spurring over the medial epicondyle.    MRI of the left elbow reviewed showing arthrosis of the radiocapitellar joint.  There is high-grade near full-thickness tear of the posterior bundle of the ulnar collateral ligament    EMG of the left elbow confirms moderate cubital tunnel syndrome without denervation    Assessment & Plan    Medial epicondylitis of left elbow    Cubital tunnel syndrome on left             Treatment options discussed with him in detail.  He has a work injury to the left elbow with a persistent medial-sided elbow pain.  He does have positive Tinel's.  EMG does confirm cubital tunnel syndrome without denervation.  MRI does confirm near full-thickness rupture of the posterior band of the ulnar collateral ligament.  His elbow does feel stable on clinical exam.  At this point he has failed conservative treatment with physical therapy and splint and taking.  We discussed operative options including ulnar nerve decompression with possible transposition.  We also discussed possible need for ligament reconstruction although unlikely.  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  Despite the risks of surgery he does elect to proceed.      _________________________________________________________________      Diagnosis and findings were  discussed with him in lay terms. I discussed the findings and treatment options with them at length. Questions were actively sought and all questions were answered to their apparent satisfaction. We discussed the risks and benefits of surgery. We reviewed the operative indications surgical technique and potential rehabilitation involved. Risks including, but not limited to pain, bleeding, infection, damage to surrounding neurovascular structures, and other soft tissues, decreased range of motion, instability, poor cosmetic result, scarring/painful scars, poor functional result, reflex sympathetic dystrophy. We discussed as well the risk of anesthesia, DVT/PE and possible need for additional surgical intervention in lay terms. Despite the risks as explained to them, they wished to proceed with surgery. He expressed understanding and agreement with the treatment plan and understand that no guarantee of outcome is implied or expressed given.       Giorgi Matias will be scheduled for the following procedure(s):     Ulnar nerve decompression left elbow  Possible ulnar collateral ligament repair left elbow        Post-Op Medications to be prescribed:   Percocet 5/325mg Take 1-2 tablets every 4-6 hours PRN pain #28  Zofran 4mg oral disintegrating tablets every 8 hours PRN nausea/vomiting   Motrin 600 mg TID PRN     DME/Bracing:  None  Post-op Physical Therapy to begin: 2 weeks    Medical Clearance: No  Hx of DVT,PE, anesthetic complications: No    Additional notes/concerns:  None    Opioid Disclosure  Today we discussed the reasons why the prescription is necessary as well as: the risks of addiction and overdose associated with opioid drugs and the dangers of taking opioid drugs with alcohol, benzodiazepines and other central nervous system depressants; alternative treatments that may be available; the risks associated with the use of the drugs being prescribed, specifically that opioids are highly addictive, even when  taken as prescribed, that there is a risk of developing a physical or psychological dependence on the controlled dangerous substance, and that the risks of taking more opioids than prescribed or mixing sedatives, benzodiazepines or alcohol with opioids can result in fatal respiratory depression.

## 2023-11-28 NOTE — TELEPHONE ENCOUNTER
----- Message from Ghazal Douglas LPN sent at 11/24/2023  1:59 PM CST -----  Contact: Joe from [facility]    ----- Message -----  From: Nguyen Santa  Sent: 11/22/2023   2:51 PM CST  To: [facility] Staff    Type:  Needs Medical Advice    Who Called: Matteo from [employer]  Symptoms (please be specific): checking the status of a fax that was sent to dr and caller would like to know how long it will take to receive the fax back. Needs office visits notes and disability form completed.  Would the patient rather a call back or a response via [facility]? call  Best Call Back Number:  claim # [id number]  Additional Information: please advise and thank you.

## 2023-11-28 NOTE — H&P (VIEW-ONLY)
Patient ID: Giorgi Matias is a 53 y.o. male    Chief Complaint:   Chief Complaint   Patient presents with    Left Elbow - Pain       History of Present Illness:    Pleasant 53-year-old male here for evaluation of left elbow pain.  This is a workman's compensation case.  He was at work when he felt a pop in his left elbow.  He has had pain in his medial left elbow since then and states it feels like a nerve injury.  Pain is not really improve for the past 3 weeks or so.  Has not tried any bracing or injections or physical therapy.  Denies any numbness or tingling in the ulnar 2 digits.    ____________________________________________________________________    Interval history 11/28/2023 : Patient returns today for follow up of his left elbow.  Pain treated non operatively for ulnar nerve symptoms as well as ligament injury to his elbow.  Currently in physical therapy and wearing nighttime splints.  Reports continued pain numbness.    PAST MEDICAL HISTORY:   Past Medical History:   Diagnosis Date    Sleep apnea      PAST SURGICAL HISTORY:   Past Surgical History:   Procedure Laterality Date    CARPAL TUNNEL RELEASE Bilateral     CORRECTION OF HAMMER TOE Left 10/30/2020    Procedure: CORRECTION, HAMMER TOE;  Surgeon: Kranthi Mahoney DPM;  Location: [facility] OR;  Service: Podiatry;  Laterality: Left;  LOCAL MAC + REGIONAL  Brice Rivera notified 10/19, DEVIN kwok confirmed 10/29/2020 KB    ENDOSCOPIC PLANTAR FASCIOTOMY Left 10/30/2020    Procedure: FASCIOTOMY, PLANTAR, ENDOSCOPIC;  Surgeon: Kranthi Mahoney DPM;  Location: [facility] OR;  Service: Podiatry;  Laterality: Left;  video    INJECTION OF ANESTHETIC AGENT AROUND MEDIAL BRANCH NERVES INNERVATING LUMBAR FACET JOINT Bilateral 2/7/2022    Procedure: Block-nerve-medial branch-lumbar L2,3,4,5;  Surgeon: Mj Babcock MD;  Location: [facility] OR;  Service: Pain Management;  Laterality: Bilateral;  L2,L3,L4,L5    SHOULDER ARTHROSCOPY W/ ROTATOR CUFF REPAIR Right     SHOULDER  SURGERY      SURGICAL REMOVAL OF FASCIA Left 9/24/2021    Procedure: FASCIECTOMY;  Surgeon: Kranthi Mahoeny DPM;  Location: [facility];  Service: Podiatry;  Laterality: Left;  Anesthesia: Regional Nerve Block  Rep:None     FAMILY HISTORY: No family history on file.  SOCIAL HISTORY:   Social History     Occupational History    Not on file   Tobacco Use    Smoking status: Never    Smokeless tobacco: Never   Substance and Sexual Activity    Alcohol use: Yes    Drug use: Never    Sexual activity: Not on file        MEDICATIONS:   Current Outpatient Medications:     amoxicillin (AMOXIL) 500 MG capsule, Take 500 mg by mouth 2 (two) times daily., Disp: , Rfl:     aspirin 81 MG Chew, Take 81 mg by mouth once daily., Disp: , Rfl:     cholecalciferol, vitamin D3, (VITAMIN D3) 50 mcg (2,000 unit) Tab, TAKE TWO TABLETS BY MOUTH EVERY DAY AS A VITAMIN SUPPLEMENT, Disp: , Rfl:     diclofenac sodium (VOLTAREN) 1 % Gel, Apply topically., Disp: , Rfl:     meloxicam (MOBIC) 15 MG tablet, Take 1 tablet (15 mg total) by mouth once daily., Disp: 90 tablet, Rfl: 0    ON-Q PAIN PUMP 400 mL Misc 1 each with ropivacaine (PF) 2 mg/ml (0.2%) 2 mg/mL (0.2 %) Soln 400 mL, by Perineural route continuous., Disp: , Rfl:     terbinafine HCL (LAMISIL) 250 mg tablet, Take 250 mg by mouth once daily., Disp: , Rfl:     traZODone (DESYREL) 50 MG tablet, , Disp: , Rfl:     mirtazapine (REMERON) 30 MG tablet, TAKE ONE TABLET BY MOUTH AT BEDTIME FOR DEPRESSION, Disp: , Rfl:     rosuvastatin (CRESTOR) 10 MG tablet, Take 1 tablet by mouth once daily., Disp: , Rfl:   ALLERGIES:   Review of patient's allergies indicates:   Allergen Reactions    Shellfish containing products Other (See Comments)     Reports reaction to crawfish and shrimp. Reports chest tightness and itching. Unknown if allergic to iodine or IV contrast Dye.         Physical Exam     Vitals:    11/28/23 1240   Resp: 16     Alert and oriented to person, place and time. No acute distress.  Well-groomed, not ill appearing. Pupils round and reactive, normal respiratory effort, no audible wheezing.     On exam he has range of motion of the left elbow 10° of extension and 110° of flexion.  He has positive Tinel's of the medial epicondyle.  He has no significant pain with valgus stress in extension or flexion.  No pain over lateral epicondyle.  No pain with resisted wrist extension or long finger extension.        Imaging:       X-Ray: I have reviewed all pertinent results/findings and my personal findings are:  Degenerative changes of the elbow with diminished joint space narrowing.  There is spurring over the medial epicondyle.    MRI of the left elbow reviewed showing arthrosis of the radiocapitellar joint.  There is high-grade near full-thickness tear of the posterior bundle of the ulnar collateral ligament    EMG of the left elbow confirms moderate cubital tunnel syndrome without denervation    Assessment & Plan    Medial epicondylitis of left elbow    Cubital tunnel syndrome on left             Treatment options discussed with him in detail.  He has a work injury to the left elbow with a persistent medial-sided elbow pain.  He does have positive Tinel's.  EMG does confirm cubital tunnel syndrome without denervation.  MRI does confirm near full-thickness rupture of the posterior band of the ulnar collateral ligament.  His elbow does feel stable on clinical exam.  At this point he has failed conservative treatment with physical therapy and splint and taking.  We discussed operative options including ulnar nerve decompression with possible transposition.  We also discussed possible need for ligament reconstruction although unlikely.  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  Despite the risks of surgery he does elect to proceed.      _________________________________________________________________      Diagnosis and findings were  discussed with him in lay terms. I discussed the findings and treatment options with them at length. Questions were actively sought and all questions were answered to their apparent satisfaction. We discussed the risks and benefits of surgery. We reviewed the operative indications surgical technique and potential rehabilitation involved. Risks including, but not limited to pain, bleeding, infection, damage to surrounding neurovascular structures, and other soft tissues, decreased range of motion, instability, poor cosmetic result, scarring/painful scars, poor functional result, reflex sympathetic dystrophy. We discussed as well the risk of anesthesia, DVT/PE and possible need for additional surgical intervention in lay terms. Despite the risks as explained to them, they wished to proceed with surgery. He expressed understanding and agreement with the treatment plan and understand that no guarantee of outcome is implied or expressed given.       Giorgi Matias will be scheduled for the following procedure(s):     Ulnar nerve decompression left elbow  Possible ulnar collateral ligament repair left elbow        Post-Op Medications to be prescribed:   Percocet 5/325mg Take 1-2 tablets every 4-6 hours PRN pain #28  Zofran 4mg oral disintegrating tablets every 8 hours PRN nausea/vomiting   Motrin 600 mg TID PRN     DME/Bracing:  None  Post-op Physical Therapy to begin: 2 weeks    Medical Clearance: No  Hx of DVT,PE, anesthetic complications: No    Additional notes/concerns:  None    Opioid Disclosure  Today we discussed the reasons why the prescription is necessary as well as: the risks of addiction and overdose associated with opioid drugs and the dangers of taking opioid drugs with alcohol, benzodiazepines and other central nervous system depressants; alternative treatments that may be available; the risks associated with the use of the drugs being prescribed, specifically that opioids are highly addictive, even when  taken as prescribed, that there is a risk of developing a physical or psychological dependence on the controlled dangerous substance, and that the risks of taking more opioids than prescribed or mixing sedatives, benzodiazepines or alcohol with opioids can result in fatal respiratory depression.

## 2023-11-28 NOTE — PROGRESS NOTES
Outpatient Therapy Discharge Summary     Date: 11-28-23      Name: Giorgi Matias  [facility] Number: [id number]    Therapy Diagnosis: No diagnosis found.  Physician: No ref. provider found    Physician Orders: see evaluation  Medical Diagnosis: see evaluation  Evaluation Date: 11-7-23      Date of Last visit: 11-16-23  Total Visits Received: 3  Cancelled Visits: see epic  No Show Visits: see epic    Assessment    Goals: see last note    Discharge reason: Other:  see last referring doctor's note    Plan   This patient is discharged from Occupational Therapy

## 2023-11-29 DIAGNOSIS — G56.22 CUBITAL TUNNEL SYNDROME ON LEFT: Primary | ICD-10-CM

## 2023-11-29 DIAGNOSIS — Z01.818 PRE-OP TESTING: ICD-10-CM

## 2023-11-29 RX ORDER — MUPIROCIN 20 MG/G
OINTMENT TOPICAL
Status: CANCELLED | OUTPATIENT
Start: 2023-11-29

## 2023-12-08 ENCOUNTER — HOSPITAL ENCOUNTER (OUTPATIENT)
Dept: PREADMISSION TESTING | Facility: HOSPITAL | Age: 53
Discharge: HOME | End: 2023-12-08
Attending: ORTHOPAEDIC SURGERY
Payer: COMMERCIAL

## 2023-12-08 ENCOUNTER — HOSPITAL ENCOUNTER (OUTPATIENT)
Dept: RADIOLOGY | Facility: HOSPITAL | Age: 53
Discharge: HOME | End: 2023-12-08
Attending: ORTHOPAEDIC SURGERY
Payer: COMMERCIAL

## 2023-12-08 VITALS — BODY MASS INDEX: 38.36 KG/M2 | HEIGHT: 69 IN | WEIGHT: 259 LBS

## 2023-12-08 DIAGNOSIS — Z01.818 PRE-OP TESTING: ICD-10-CM

## 2023-12-08 DIAGNOSIS — G56.22 CUBITAL TUNNEL SYNDROME ON LEFT: ICD-10-CM

## 2023-12-08 DIAGNOSIS — Z01.818 PRE-OP EXAM: ICD-10-CM

## 2023-12-08 PROCEDURE — 93005 ELECTROCARDIOGRAM TRACING: CPT

## 2023-12-08 PROCEDURE — 71046 X-RAY EXAM CHEST 2 VIEWS: CPT | Mod: 26,,, | Performed by: RADIOLOGY

## 2023-12-08 PROCEDURE — 93010 ELECTROCARDIOGRAM REPORT: CPT | Mod: ,,, | Performed by: INTERNAL MEDICINE

## 2023-12-08 PROCEDURE — 71046 XR CHEST PA AND LATERAL: ICD-10-PCS | Mod: 26,,, | Performed by: RADIOLOGY

## 2023-12-08 PROCEDURE — 71046 X-RAY EXAM CHEST 2 VIEWS: CPT | Mod: TC

## 2023-12-08 PROCEDURE — 93010 EKG 12-LEAD: ICD-10-PCS | Mod: ,,, | Performed by: INTERNAL MEDICINE

## 2023-12-08 RX ORDER — BUPROPION HYDROCHLORIDE 300 MG/1
300 TABLET ORAL NIGHTLY
COMMUNITY

## 2023-12-08 NOTE — DISCHARGE INSTRUCTIONS
To confirm, Your doctor has instructed you that surgery is scheduled for:     Please report to [facility], Registration the morning of surgery. You must check-in and receive a wristband before going to your procedure.  [address] [place] [address] [address] [place] [address]    Pre-Op will call the afternoon prior to surgery between 1:00 and 6:00 PM with the final arrival time.  Phone number: [phone]    PLEASE NOTE:  The surgery schedule has many variables which may affect the time of your surgery case.  Family members should be available if your surgery time changes.  Plan to be here the day of your procedure between 4-6 hours.    MEDICATIONS:  TAKE ONLY THESE MEDICATIONS WITH A SMALL SIP OF WATER THE MORNING OF YOUR PROCEDURE:      DO NOT TAKE THESE MEDICATIONS 5-7 DAYS PRIOR to your procedure or per your surgeon's request:   ASPIRIN, ALEVE, ADVIL, IBUPROFEN, FISH OIL VITAMIN E, HERBALS  (May take Tylenol)    ONLY if you are prescribed any types of blood thinners such as:  Aspirin, Coumadin, Plavix, Pradaxa, Xarelto, Aggrenox, Effient, Eliquis, Savasya, Brilinta, or any other, ask your surgeon whether you should stop taking them and how long before surgery you should stop.  You may also need to verify with the prescribing physician if it is ok to stop your medication.      INSTRUCTIONS IMPORTANT!!  Do not eat or drink anything between midnight and the time of your procedure- this includes gum, mints, and candy.  Do not smoke or drink alcoholic beverages 24 hours prior to your procedure.  Shower the night before AND the morning of your procedure with a Chlorhexidine wash such as Hibiclens or Dial antibacterial soap from the neck down.  Do not get it on your face or in your eyes.  You may use your own shampoo and face wash. This helps your skin to be as bacteria free as possible.    If you wear contact lenses, dentures, hearing aids or glasses, bring a container to put them in during surgery and give to a  family member for safe keeping.  Please leave all jewelry, piercing's and valuables at home. You must remove your false eyelashes prior to surgery.    DO NOT remove hair from the surgery site.  Do not shave the incision site unless you are given specific instructions to do so.    ONLY if you have been diagnosed with sleep apnea please bring your C-PAP machine.  ONLY if you wear home oxygen please bring your portable oxygen tank the day of your procedure.  ONLY if you have a history of OPEN HEART SURGERY you will need a clearance from your Cardiologist per Anesthesia.      ONLY for patients requiring bowel prep, written instructions will be given by your doctor's office.  ONLY if you have a neuro stimulator, please bring the controller with you the morning of surgery  ONLY if a type and screen test is needed before surgery, please return:  If your doctor has scheduled you for an overnight stay, bring a small overnight bag with any personal items you need.  Make arrangements in advance for transportation home by a responsible adult.  It is not safe to drive a vehicle during the 24 hours after anesthesia.          All  facilities and properties are tobacco free.  Smoking is NOT allowed.   If you have any questions about these instructions, call Pre-Op Admit  Nursing at [phone] or the Pre-Op Day Surgery Unit at [phone].                    To confirm, Your doctor has instructed you that surgery is scheduled for:     Please report to [facility], Registration the morning of surgery. You must check-in and receive a wristband before going to your procedure.  [address] [place] [address] [place] [address]    Pre-Op will call the afternoon prior to surgery between 1:00 and 6:00 PM with the final arrival time.  Phone number: [phone]    PLEASE NOTE:  The surgery schedule has many variables which may affect the time of your surgery case.  Family members should be available if your surgery time  changes.  Plan to be here the day of your procedure between 4-6 hours.    MEDICATIONS:  TAKE ONLY THESE MEDICATIONS WITH A SMALL SIP OF WATER THE MORNING OF YOUR PROCEDURE:      DO NOT TAKE THESE MEDICATIONS 5-7 DAYS PRIOR to your procedure or per your surgeon's request:   ASPIRIN, ALEVE, ADVIL, IBUPROFEN, FISH OIL VITAMIN E, HERBALS  (May take Tylenol)    ONLY if you are prescribed any types of blood thinners such as:  Aspirin, Coumadin, Plavix, Pradaxa, Xarelto, Aggrenox, Effient, Eliquis, Savasya, Brilinta, or any other, ask your surgeon whether you should stop taking them and how long before surgery you should stop.  You may also need to verify with the prescribing physician if it is ok to stop your medication.      INSTRUCTIONS IMPORTANT!!  Do not eat or drink anything between midnight and the time of your procedure- this includes gum, mints, and candy.  Do not smoke or drink alcoholic beverages 24 hours prior to your procedure.  Shower the night before AND the morning of your procedure with a Chlorhexidine wash such as Hibiclens or Dial antibacterial soap from the neck down.  Do not get it on your face or in your eyes.  You may use your own shampoo and face wash. This helps your skin to be as bacteria free as possible.    If you wear contact lenses, dentures, hearing aids or glasses, bring a container to put them in during surgery and give to a family member for safe keeping.  Please leave all jewelry, piercing's and valuables at home. You must remove your false eyelashes prior to surgery.    DO NOT remove hair from the surgery site.  Do not shave the incision site unless you are given specific instructions to do so.    ONLY if you have been diagnosed with sleep apnea please bring your C-PAP machine.  ONLY if you wear home oxygen please bring your portable oxygen tank the day of your procedure.  ONLY if you have a history of OPEN HEART SURGERY you will need a clearance from your Cardiologist per Anesthesia.       ONLY for patients requiring bowel prep, written instructions will be given by your doctor's office.  ONLY if you have a neuro stimulator, please bring the controller with you the morning of surgery  ONLY if a type and screen test is needed before surgery, please return:  If your doctor has scheduled you for an overnight stay, bring a small overnight bag with any personal items you need.  Make arrangements in advance for transportation home by a responsible adult.  It is not safe to drive a vehicle during the 24 hours after anesthesia.          All  facilities and properties are tobacco free.  Smoking is NOT allowed.   If you have any questions about these instructions, call Pre-Op Admit  Nursing at [phone] or the Pre-Op Day Surgery Unit at [phone].

## 2023-12-13 ENCOUNTER — ANESTHESIA EVENT (OUTPATIENT)
Dept: SURGERY | Facility: HOSPITAL | Age: 53
End: 2023-12-13
Payer: COMMERCIAL

## 2023-12-13 DIAGNOSIS — G56.22 CUBITAL TUNNEL SYNDROME ON LEFT: Primary | ICD-10-CM

## 2023-12-13 RX ORDER — ONDANSETRON 4 MG/1
4 TABLET, ORALLY DISINTEGRATING ORAL EVERY 8 HOURS PRN
Qty: 30 TABLET | Refills: 0 | Status: SHIPPED | OUTPATIENT
Start: 2023-12-13

## 2023-12-13 RX ORDER — IBUPROFEN 600 MG/1
600 TABLET ORAL 3 TIMES DAILY
Qty: 90 TABLET | Refills: 0 | Status: SHIPPED | OUTPATIENT
Start: 2023-12-13

## 2023-12-13 RX ORDER — OXYCODONE AND ACETAMINOPHEN 5; 325 MG/1; MG/1
1 TABLET ORAL EVERY 6 HOURS PRN
Qty: 28 TABLET | Refills: 0 | Status: SHIPPED | OUTPATIENT
Start: 2023-12-13

## 2023-12-14 ENCOUNTER — HOSPITAL ENCOUNTER (OUTPATIENT)
Facility: HOSPITAL | Age: 53
Discharge: HOME | End: 2023-12-14
Attending: ORTHOPAEDIC SURGERY | Admitting: ORTHOPAEDIC SURGERY
Payer: COMMERCIAL

## 2023-12-14 ENCOUNTER — ANESTHESIA (OUTPATIENT)
Dept: SURGERY | Facility: HOSPITAL | Age: 53
End: 2023-12-14
Payer: COMMERCIAL

## 2023-12-14 DIAGNOSIS — Z01.818 PRE-OP TESTING: ICD-10-CM

## 2023-12-14 DIAGNOSIS — M77.02 MEDIAL EPICONDYLITIS OF LEFT ELBOW: Primary | ICD-10-CM

## 2023-12-14 DIAGNOSIS — S53.442A ELBOW SPRAIN, ULNAR COLLATERAL LIGAMENT, LEFT, INITIAL ENCOUNTER: ICD-10-CM

## 2023-12-14 DIAGNOSIS — G56.22 CUBITAL TUNNEL SYNDROME ON LEFT: ICD-10-CM

## 2023-12-14 PROCEDURE — 37000009 HC ANESTHESIA EA ADD 15 MINS: Performed by: ORTHOPAEDIC SURGERY

## 2023-12-14 PROCEDURE — 37000008 HC ANESTHESIA 1ST 15 MINUTES: Performed by: ORTHOPAEDIC SURGERY

## 2023-12-14 PROCEDURE — 71000039 HC RECOVERY, EACH ADD'L HOUR: Performed by: ORTHOPAEDIC SURGERY

## 2023-12-14 PROCEDURE — 25000003 PHARM REV CODE 250: Performed by: ANESTHESIOLOGY

## 2023-12-14 PROCEDURE — 36000707: Performed by: ORTHOPAEDIC SURGERY

## 2023-12-14 PROCEDURE — D9220A PRA ANESTHESIA: Mod: CRNA,,, | Performed by: NURSE ANESTHETIST, CERTIFIED REGISTERED

## 2023-12-14 PROCEDURE — 25000003 PHARM REV CODE 250: Performed by: NURSE ANESTHETIST, CERTIFIED REGISTERED

## 2023-12-14 PROCEDURE — 63600175 PHARM REV CODE 636 W HCPCS: Performed by: ORTHOPAEDIC SURGERY

## 2023-12-14 PROCEDURE — 64718 REVISE ULNAR NERVE AT ELBOW: CPT | Mod: LT,,, | Performed by: ORTHOPAEDIC SURGERY

## 2023-12-14 PROCEDURE — 71000015 HC POSTOP RECOV 1ST HR: Performed by: ORTHOPAEDIC SURGERY

## 2023-12-14 PROCEDURE — 71000033 HC RECOVERY, INTIAL HOUR: Performed by: ORTHOPAEDIC SURGERY

## 2023-12-14 PROCEDURE — 63600175 PHARM REV CODE 636 W HCPCS: Performed by: ANESTHESIOLOGY

## 2023-12-14 PROCEDURE — D9220A PRA ANESTHESIA: Mod: ANES,,, | Performed by: ANESTHESIOLOGY

## 2023-12-14 PROCEDURE — D9220A PRA ANESTHESIA: ICD-10-PCS | Mod: CRNA,,, | Performed by: NURSE ANESTHETIST, CERTIFIED REGISTERED

## 2023-12-14 PROCEDURE — 94799 UNLISTED PULMONARY SVC/PX: CPT

## 2023-12-14 PROCEDURE — 63600175 PHARM REV CODE 636 W HCPCS: Performed by: NURSE ANESTHETIST, CERTIFIED REGISTERED

## 2023-12-14 PROCEDURE — 36000706: Performed by: ORTHOPAEDIC SURGERY

## 2023-12-14 PROCEDURE — D9220A PRA ANESTHESIA: ICD-10-PCS | Mod: ANES,,, | Performed by: ANESTHESIOLOGY

## 2023-12-14 PROCEDURE — 25000003 PHARM REV CODE 250: Performed by: ORTHOPAEDIC SURGERY

## 2023-12-14 RX ORDER — ONDANSETRON HYDROCHLORIDE 2 MG/ML
INJECTION, SOLUTION INTRAMUSCULAR; INTRAVENOUS
Status: DISCONTINUED | OUTPATIENT
Start: 2023-12-14 | End: 2023-12-14

## 2023-12-14 RX ORDER — PROPOFOL 10 MG/ML
VIAL (ML) INTRAVENOUS
Status: DISCONTINUED | OUTPATIENT
Start: 2023-12-14 | End: 2023-12-14

## 2023-12-14 RX ORDER — DEXAMETHASONE SODIUM PHOSPHATE 4 MG/ML
INJECTION, SOLUTION INTRA-ARTICULAR; INTRALESIONAL; INTRAMUSCULAR; INTRAVENOUS; SOFT TISSUE
Status: DISCONTINUED | OUTPATIENT
Start: 2023-12-14 | End: 2023-12-14

## 2023-12-14 RX ORDER — FENTANYL CITRATE 50 UG/ML
25 INJECTION, SOLUTION INTRAMUSCULAR; INTRAVENOUS EVERY 5 MIN PRN
Status: COMPLETED | OUTPATIENT
Start: 2023-12-14 | End: 2023-12-14

## 2023-12-14 RX ORDER — PHENYLEPHRINE HYDROCHLORIDE 10 MG/ML
INJECTION INTRAVENOUS
Status: DISCONTINUED | OUTPATIENT
Start: 2023-12-14 | End: 2023-12-14

## 2023-12-14 RX ORDER — ACETAMINOPHEN 10 MG/ML
INJECTION, SOLUTION INTRAVENOUS
Status: DISCONTINUED | OUTPATIENT
Start: 2023-12-14 | End: 2023-12-14

## 2023-12-14 RX ORDER — LIDOCAINE HYDROCHLORIDE 10 MG/ML
1 INJECTION, SOLUTION EPIDURAL; INFILTRATION; INTRACAUDAL; PERINEURAL ONCE
Status: ACTIVE | OUTPATIENT
Start: 2023-12-14

## 2023-12-14 RX ORDER — ONDANSETRON HYDROCHLORIDE 2 MG/ML
4 INJECTION, SOLUTION INTRAVENOUS ONCE AS NEEDED
Status: ACTIVE | OUTPATIENT
Start: 2023-12-14 | End: 2035-05-12

## 2023-12-14 RX ORDER — DEXMEDETOMIDINE HYDROCHLORIDE 100 UG/ML
INJECTION, SOLUTION INTRAVENOUS
Status: DISCONTINUED | OUTPATIENT
Start: 2023-12-14 | End: 2023-12-14

## 2023-12-14 RX ORDER — SODIUM CHLORIDE 0.9 % (FLUSH) 0.9 %
10 SYRINGE (ML) INJECTION
Status: DISCONTINUED | OUTPATIENT
Start: 2023-12-14 | End: 2023-12-14 | Stop reason: HOSPADM

## 2023-12-14 RX ORDER — MUPIROCIN 20 MG/G
OINTMENT TOPICAL
Status: DISCONTINUED | OUTPATIENT
Start: 2023-12-14 | End: 2023-12-14 | Stop reason: HOSPADM

## 2023-12-14 RX ORDER — KETOROLAC TROMETHAMINE 30 MG/ML
INJECTION, SOLUTION INTRAMUSCULAR; INTRAVENOUS
Status: DISCONTINUED | OUTPATIENT
Start: 2023-12-14 | End: 2023-12-14

## 2023-12-14 RX ORDER — OXYCODONE HYDROCHLORIDE 10 MG/1
10 TABLET ORAL EVERY 4 HOURS PRN
Status: CANCELLED | OUTPATIENT
Start: 2023-12-14

## 2023-12-14 RX ORDER — CEFAZOLIN SODIUM 2 G/50ML
2 SOLUTION INTRAVENOUS
Status: COMPLETED | OUTPATIENT
Start: 2023-12-14 | End: 2023-12-14

## 2023-12-14 RX ORDER — BUPIVACAINE HYDROCHLORIDE 5 MG/ML
INJECTION, SOLUTION EPIDURAL; INTRACAUDAL
Status: DISCONTINUED | OUTPATIENT
Start: 2023-12-14 | End: 2023-12-14 | Stop reason: HOSPADM

## 2023-12-14 RX ORDER — HYDROCODONE BITARTRATE AND ACETAMINOPHEN 5; 325 MG/1; MG/1
1 TABLET ORAL EVERY 4 HOURS PRN
Status: CANCELLED | OUTPATIENT
Start: 2023-12-14

## 2023-12-14 RX ORDER — LIDOCAINE HYDROCHLORIDE 20 MG/ML
INJECTION INTRAVENOUS
Status: DISCONTINUED | OUTPATIENT
Start: 2023-12-14 | End: 2023-12-14

## 2023-12-14 RX ORDER — ONDANSETRON 4 MG/1
8 TABLET, ORALLY DISINTEGRATING ORAL EVERY 8 HOURS PRN
Status: CANCELLED | OUTPATIENT
Start: 2023-12-14

## 2023-12-14 RX ORDER — OXYCODONE HYDROCHLORIDE 5 MG/1
5 TABLET ORAL ONCE AS NEEDED
Status: COMPLETED | OUTPATIENT
Start: 2023-12-14 | End: 2023-12-14

## 2023-12-14 RX ORDER — MIDAZOLAM HYDROCHLORIDE 1 MG/ML
INJECTION INTRAMUSCULAR; INTRAVENOUS
Status: DISCONTINUED | OUTPATIENT
Start: 2023-12-14 | End: 2023-12-14

## 2023-12-14 RX ORDER — FENTANYL CITRATE 50 UG/ML
INJECTION, SOLUTION INTRAMUSCULAR; INTRAVENOUS
Status: DISCONTINUED | OUTPATIENT
Start: 2023-12-14 | End: 2023-12-14

## 2023-12-14 RX ADMIN — PHENYLEPHRINE HYDROCHLORIDE 100 MCG: 10 INJECTION INTRAVENOUS at 07:12

## 2023-12-14 RX ADMIN — FENTANYL CITRATE 25 MCG: 50 INJECTION INTRAMUSCULAR; INTRAVENOUS at 09:12

## 2023-12-14 RX ADMIN — DEXMEDETOMIDINE HYDROCHLORIDE 12 MCG: 100 INJECTION, SOLUTION INTRAVENOUS at 08:12

## 2023-12-14 RX ADMIN — ACETAMINOPHEN 1000 MG: 10 INJECTION, SOLUTION INTRAVENOUS at 07:12

## 2023-12-14 RX ADMIN — PROPOFOL 200 MG: 10 INJECTION, EMULSION INTRAVENOUS at 07:12

## 2023-12-14 RX ADMIN — FENTANYL CITRATE 25 MCG: 0.05 INJECTION, SOLUTION INTRAMUSCULAR; INTRAVENOUS at 08:12

## 2023-12-14 RX ADMIN — SODIUM CHLORIDE, SODIUM GLUCONATE, SODIUM ACETATE, POTASSIUM CHLORIDE, MAGNESIUM CHLORIDE, SODIUM PHOSPHATE, DIBASIC, AND POTASSIUM PHOSPHATE: .53; .5; .37; .037; .03; .012; .00082 INJECTION, SOLUTION INTRAVENOUS at 05:12

## 2023-12-14 RX ADMIN — KETOROLAC TROMETHAMINE 30 MG: 30 INJECTION, SOLUTION INTRAMUSCULAR; INTRAVENOUS at 08:12

## 2023-12-14 RX ADMIN — FENTANYL CITRATE 25 MCG: 0.05 INJECTION, SOLUTION INTRAMUSCULAR; INTRAVENOUS at 07:12

## 2023-12-14 RX ADMIN — OXYCODONE 5 MG: 5 TABLET ORAL at 09:12

## 2023-12-14 RX ADMIN — GLYCOPYRROLATE 0.1 MG: 0.2 INJECTION, SOLUTION INTRAMUSCULAR; INTRAVITREAL at 07:12

## 2023-12-14 RX ADMIN — DEXAMETHASONE SODIUM PHOSPHATE 4 MG: 4 INJECTION, SOLUTION INTRA-ARTICULAR; INTRALESIONAL; INTRAMUSCULAR; INTRAVENOUS; SOFT TISSUE at 07:12

## 2023-12-14 RX ADMIN — PHENYLEPHRINE HYDROCHLORIDE 100 MCG: 10 INJECTION INTRAVENOUS at 08:12

## 2023-12-14 RX ADMIN — PHENYLEPHRINE HYDROCHLORIDE 200 MCG: 10 INJECTION INTRAVENOUS at 07:12

## 2023-12-14 RX ADMIN — DEXMEDETOMIDINE HYDROCHLORIDE 8 MCG: 100 INJECTION, SOLUTION INTRAVENOUS at 08:12

## 2023-12-14 RX ADMIN — SODIUM CHLORIDE, SODIUM GLUCONATE, SODIUM ACETATE, POTASSIUM CHLORIDE, MAGNESIUM CHLORIDE, SODIUM PHOSPHATE, DIBASIC, AND POTASSIUM PHOSPHATE: .53; .5; .37; .037; .03; .012; .00082 INJECTION, SOLUTION INTRAVENOUS at 08:12

## 2023-12-14 RX ADMIN — ONDANSETRON 4 MG: 2 INJECTION INTRAMUSCULAR; INTRAVENOUS at 07:12

## 2023-12-14 RX ADMIN — MIDAZOLAM HYDROCHLORIDE 2 MG: 1 INJECTION, SOLUTION INTRAMUSCULAR; INTRAVENOUS at 07:12

## 2023-12-14 RX ADMIN — CEFAZOLIN SODIUM 2 G: 2 SOLUTION INTRAVENOUS at 07:12

## 2023-12-14 RX ADMIN — MUPIROCIN: 20 OINTMENT TOPICAL at 05:12

## 2023-12-14 RX ADMIN — LIDOCAINE HYDROCHLORIDE 100 MG: 20 INJECTION, SOLUTION INTRAVENOUS at 07:12

## 2023-12-14 NOTE — PLAN OF CARE
VSS. NAD. Resp E/U. Calm and cooperative. Speech appropriate. Tolerating clear liquids. Denies nausea. Pain controlled. IV patent. No swelling or redness. Dressing to lt arm CDI. Family notified of patient status. All safety measures taken. Bed in low position. Bedrails up x2. Bed locked. Cleared by Anesthesia.

## 2023-12-14 NOTE — ANESTHESIA POSTPROCEDURE EVALUATION
Anesthesia Post Evaluation    Patient: Giorgi Matias    Procedure(s) Performed: Procedure(s) (LRB):  RELEASE, CUBITAL TUNNEL (Left)    Final Anesthesia Type: general      Patient location during evaluation: PACU  Patient participation: Yes- Able to Participate  Level of consciousness: awake and alert and oriented  Post-procedure vital signs: reviewed and stable  Pain management: adequate  Airway patency: patent    PONV status at discharge: No PONV  Anesthetic complications: no      Cardiovascular status: blood pressure returned to baseline and stable  Respiratory status: unassisted and spontaneous ventilation  Hydration status: euvolemic  Follow-up not needed.              Vitals Value Taken Time   /73 12/14/23 1015   Temp 36.7 °C (98.1 °F) 12/14/23 0955   Pulse 79 12/14/23 1015   Resp 18 12/14/23 1015   SpO2 98 % 12/14/23 1015         Event Time   Out of Recovery 09:57:00         Pain/Edmar Score: Pain Rating Prior to Med Admin: 6 (12/14/2023  9:35 AM)  Pain Rating Post Med Admin: 4 (12/14/2023  9:57 AM)  Edmar Score: 8 (12/14/2023  9:45 AM)  Modified Edmar Score: 19 (12/14/2023 10:15 AM)

## 2023-12-14 NOTE — TRANSFER OF CARE
"Anesthesia Transfer of Care Note    Patient: Giorgi Matias    Procedure(s) Performed: Procedure(s) (LRB):  RELEASE, CUBITAL TUNNEL (Left)    Patient location: PACU    Anesthesia Type: general    Transport from OR: Transported from OR on 6-10 L/min O2 by face mask with adequate spontaneous ventilation    Post pain: adequate analgesia    Post assessment: no apparent anesthetic complications and tolerated procedure well    Post vital signs: stable    Level of consciousness: sedated    Nausea/Vomiting: no nausea/vomiting    Complications: none    Transfer of care protocol was followed      Last vitals: Visit Vitals  /72 (BP Location: Right arm, Patient Position: Lying)   Pulse 75   Temp 36.7 °C (98.1 °F) (Skin)   Resp 20   Ht 5' 9" (1.753 m)   Wt 117.5 kg (259 lb 0.7 oz)   SpO2 96%   BMI 38.25 kg/m²     "

## 2023-12-14 NOTE — PLAN OF CARE
Dressing to left arm remains clean, dry and intact.  Medications delivered to bedside.  Discharge instructions given to pt and family/friend, verbalized understanding and questions answered. Handouts provided. Belongings given back to pt. IV removed- catheter intact. Discharge via wheelchair.

## 2023-12-14 NOTE — DISCHARGE INSTRUCTIONS
Post op instructions for prevention of DVT  What is deep vein thrombosis?  Deep vein thrombosis (DVT) is the medical term for blood clots in the deep veins of the leg.  These blood clots can be dangerous.  A DVT can block a blood vessel and keep blood from getting where it needs to go.  Another problem is that the clot can travel to other parts of the body such as the lungs.  A clot that travels to the lungs is called a pulmonary embolus (PE) and can cause serious problems with breathing which can lead to death.  Am I at risk for DVT/PE?  If you are not very active, you are at risk of DVT.  Anyone confined to bed, sitting for long periods of time, recovering from surgery, etc. increases the risk of DVT.  Other risk factors are cancer diagnosis, certain medications, estrogen replacement in any form,older age, obesity, pregnancy, smoking, history of clotting disorders, and dehydration.  How will I know if I have a DVT?  Swelling in the lower leg  Pain  Warmth, redness, hardness or bulging of the vein  If you have any of these symptoms, call your doctors office right away.  Some people will not have any symptoms until the clot moves to the lungs.  What are the symptoms of a PE?  Panting, shortness of breath, or trouble breathing  Sharp, knife-like chest pain when you breathe  Coughing or coughing up blood  Rapid heartbeat  If you have any of these symptoms or get worse quickly, call 911 for emergency treatment.  How can I prevent a DVT?  Avoid long periods of inactivity and dont cross your legs--get up and walk around every hour or so.  Stay active--walking after surgery is highly encouraged.  This means you should get out of the house and walk in the neighborhood.  Going up and down stairs will not impair healing (unless advised against such activity by your doctor).    Drink plenty of noncaffeinated, nonalcoholic fluids each day to prevent dehydration.  Wear special support stockings, if they have been advised by  "your doctor.  If you travel, stop at least once an hour and walk around.  Avoid smoking (assistance with stopping is available through your healthcare provider)  Always notify your doctor if you are not able to follow the post operative instructions that are given to you at the time of discharge.  It may be necessary to prescribe one of the medications available to prevent DVT.We hope your stay was comfortable as you heal now, mend and rest.    For we have enjoyed taking care of you by giving your our best.    And as you get better, by regaining your health and strength;   We count it as a privilege to have served you and hope your time at [facility] was well spent.      Thank  You!!!              Discharge Instructions: After Your Surgery/Procedure  Youve just had surgery. During surgery you were given medicine called anesthesia to keep you relaxed and free of pain. After surgery you may have some pain or nausea. This is common. Here are some tips for feeling better and getting well after surgery.     Stay on schedule with your medication.   Going home  Your doctor or nurse will show you how to take care of yourself when you go home. He or she will also answer your questions. Have an adult family member or friend drive you home.      For your safety we recommend these precaution for the first 24 hours after your procedure:  Do not drive or use heavy equipment.  Do not make important decisions or sign legal papers.  Do not drink alcohol.  Have someone stay with you, if needed. He or she can watch for problems and help keep you safe.  Your concentration, balance, coordination, and judgement may be impaired for many hours after anesthesia.  Use caution when ambulating or standing up.     You may feel weak and "washed out" after anesthesia and surgery.      Subtle residual effects of general anesthesia or sedation with regional / local anesthesia can last more than 24 hours.  Rest for the remainder of the day or longer " if your Doctor/Surgeon has advised you to do so.  Although you may feel normal within the first 24 hours, your reflexes and mental ability may be impaired without you realizing it.  You may feel dizzy, lightheaded or sleepy for 24 hours or longer.      Be sure to go to all follow-up visits with your doctor. And rest after your surgery for as long as your doctor tells you to.  Coping with pain  If you have pain after surgery, pain medicine will help you feel better. Take it as told, before pain becomes severe. Also, ask your doctor or pharmacist about other ways to control pain. This might be with heat, ice, or relaxation. And follow any other instructions your surgeon or nurse gives you.  Tips for taking pain medicine  To get the best relief possible, remember these points:  Pain medicines can upset your stomach. Taking them with a little food may help.  Most pain relievers taken by mouth need at least 20 to 30 minutes to start to work.  Taking medicine on a schedule can help you remember to take it. Try to time your medicine so that you can take it before starting an activity. This might be before you get dressed, go for a walk, or sit down for dinner.  Constipation is a common side effect of pain medicines. Call your doctor before taking any medicines such as laxatives or stool softeners to help ease constipation. Also ask if you should skip any foods. Drinking lots of fluids and eating foods such as fruits and vegetables that are high in fiber can also help. Remember, do not take laxatives unless your surgeon has prescribed them.  Drinking alcohol and taking pain medicine can cause dizziness and slow your breathing. It can even be deadly. Do not drink alcohol while taking pain medicine.  Pain medicine can make you react more slowly to things. Do not drive or run machinery while taking pain medicine.  Your health care provider may tell you to take acetaminophen to help ease your pain. Ask him or her how much you  are supposed to take each day. Acetaminophen or other pain relievers may interact with your prescription medicines or other over-the-counter (OTC) drugs. Some prescription medicines have acetaminophen and other ingredients. Using both prescription and OTC acetaminophen for pain can cause you to overdose. Read the labels on your OTC medicines with care. This will help you to clearly know the list of ingredients, how much to take, and any warnings. It may also help you not take too much acetaminophen. If you have questions or do not understand the information, ask your pharmacist or health care provider to explain it to you before you take the OTC medicine.  Managing nausea  Some people have an upset stomach after surgery. This is often because of anesthesia, pain, or pain medicine, or the stress of surgery. These tips will help you handle nausea and eat healthy foods as you get better. If you were on a special food plan before surgery, ask your doctor if you should follow it while you get better. These tips may help:  Do not push yourself to eat. Your body will tell you when to eat and how much.  Start off with clear liquids and soup. They are easier to digest.  Next try semi-solid foods, such as mashed potatoes, applesauce, and gelatin, as you feel ready.  Slowly move to solid foods. Dont eat fatty, rich, or spicy foods at first.  Do not force yourself to have 3 large meals a day. Instead eat smaller amounts more often.  Take pain medicines with a small amount of solid food, such as crackers or toast, to avoid nausea.     Call your surgeon if  You still have pain an hour after taking medicine. The medicine may not be strong enough.  You feel too sleepy, dizzy, or groggy. The medicine may be too strong.  You have side effects like nausea, vomiting, or skin changes, such as rash, itching, or hives.       If you have obstructive sleep apnea  You were given anesthesia medicine during surgery to keep you comfortable  and free of pain. After surgery, you may have more apnea spells because of this medicine and other medicines you were given. The spells may last longer than usual.   At home:  Keep using the continuous positive airway pressure (CPAP) device when you sleep. Unless your health care provider tells you not to, use it when you sleep, day or night. CPAP is a common device used to treat obstructive sleep apnea.  Talk with your provider before taking any pain medicine, muscle relaxants, or sedatives. Your provider will tell you about the possible dangers of taking these medicines.  © 0797-4295 [employer]. [address] [place] [id number]. All rights reserved. This information is not intended as a substitute for professional medical care. Always follow your healthcare professional's instructions.            Using an Incentive Spirometer    An incentive spirometer is a device that helps you do deep breathing exercises. These exercises expand your lungs, aid in circulation, and help prevent pneumonia. Deep breathing exercises also help you breathe better and improve the function of your lungs by:  Keeping your lungs clear  Strengthening your breathing muscles  Helping prevent respiratory complications or problems  The incentive spirometer gives you a way to take an active part in recover. A nurse or therapist will teach you breathing exercises. To do these exercises, you will breathe in through your mouth and not your nose. The incentive spirometer only works correctly if you breathe in through your mouth.  Steps to clear lungs  Step 1. Exhale normally. Then, inhale normally.  Relax and breathe out.  Step 2. Place your lips tightly around the mouthpiece.  Make sure the device is upright and not tilted.  Step 3. Inhale as much air as you can through the mouthpiece (don't breath through your nose).  Inhale slowly and deeply.  Hold your breath long enough to keep the balls or disk raised for at least 3 to  5 seconds, or as instructed by your healthcare provider.  Some spirometers have an indicator to let you know that you are breathing in too fast. If the indicator goes off, breathe in more slowly.  Step 4. Repeat the exercise regularly.  Do this exercise every hour while you're awake, or as instructed by your healthcare provider.  If you were taught deep breathing and coughing exercises, do them regularly as instructed by your healthcare provider.

## 2023-12-14 NOTE — ANESTHESIA PROCEDURE NOTES
Intubation    Date/Time: 12/14/2023 7:12 AM    Performed by: Sung Villareal CRNA  Authorized by: Devon Hodges MD    Intubation:     Induction:  Intravenous    Intubated:  Postinduction    Mask Ventilation:  Easy mask    Attempts:  1    Attempted By:  Student    Difficult Airway Encountered?: No      Complications:  None    Airway Device:  Supraglottic airway/LMA    Airway Device Size:  4.0    Style/Cuff Inflation:  Cuffed (inflated to minimal occlusive pressure)    Placement Verified By:  Capnometry    Complicating Factors:  None    Findings Post-Intubation:  BS equal bilateral

## 2023-12-14 NOTE — OP NOTE
12/14/2023    PREOPERATIVE DIAGNOSIS:      Cubital tunnel syndrome left elbow    POSTOPERATIVE DIAGNOSIS: Same    PROCEDURE:      Ulnar nerve decompression left elbow      SURGEON: Jon Mauro MD    ASSISTANT: Thanh Driscoll _ PAC    He was present and scrubbed for the entirety of the procedure. They were required for patient positioning, retraction, insertion of implants and closure. Without him I would have been unable to safely perform the case due to only one scrub tech available.     ANESTHESIA:  general     ESTIMATED BLOOD LOSS:  20 mL    INDICATIONS:  Giorgi Matias is a 53 y.o. year-old male who presented to my clinic with a chief complaint of left elbow pain from a work injury. 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  Patient also had ulnar nerve symptoms.  EMG showed ulnar nerve pathology.  We attempted nonoperative treatment with physical therapy.  He continued to have symptoms.  Ultimately patient elected to proceed with surgery.      We discussed the risks and benefits of the surgery in detail including stiffness, CRPS, damage to surrounding neurovascular structures, failure of fixation as well as need for revision surgery. Despite these risks they elected to proceed.       COMPONENTS USED:      * No implants in log *      DESCRIPTION OF PROCEDURE:  The patient was seen in the pre-operative area where consents were verified and the surgical site marked. They did not receive a preoperative block for intra-operative and postoperative analgesia. The patient was taken to the Operating Room where anesthesia was administered by the Anesthesia Department. He was then placed in the supine  position and all superficial neurovascular structures were well padded.  The left upper extremity  was then sterilely prepped and draped in the normal fashion.  Preoperative antibiotics were administered.  A time-out was performed verifying the procedure and laterality, all agreed and  elected to proceed as planned.    Esmarch tourniquet was used to exsanguinate the limb and the tourniquet inflated to 250 mmHg.  A 6 cm curvilinear incision was made over the medial epicondyle between the tip of the olecranon and medial epicondyle.  Hemostasis was achieved using bipolar electrocautery.  Blunt dissection was carried down through the subcutaneous tissue fat layer.  Rivera's ligament was then incised with a Metzenbaum scissor to expose the ulnar nerve.  This was significantly scarred down to the medial epicondyle.  There was fluid within the nerve sheath and fibrous tissue surrounding the nerve.  Distally we split the 2 heads of the FCU fascia and proximally we split the intermuscular septum.  Significant amount of time was spent removing and mobilize in the ulnar nerve as it had scarred down to the medial epicondyle.  I did use a vessel loop around the nerve to protect it and released behind the nerve to mobilize it.  Irrigation was performed.  The arm was taken through a passive range of motion test.  There was no subluxation.  A [place] was used proximal and distal to confirm the complete release.  We then irrigated and closed with Vicryl and Monocryl with Dermabond.  Patient tolerated the procedure well.  He was placed in a soft dressing.    Disposition:      Nonweightbearing.  Follow up 2 weeks.  Okay for gentle range of motion    Jon Mauro MD

## 2023-12-14 NOTE — BRIEF OP NOTE
[facility]  Brief Operative Note    Surgery Date: 12/14/2023     Surgeon(s) and Role:     * Jon Mauro MD - Primary    Assisting Surgeon: None    Pre-op Diagnosis:  Cubital tunnel syndrome on left [G56.22]  Pre-op testing [Z01.818]    Post-op Diagnosis:  Post-Op Diagnosis Codes:     * Cubital tunnel syndrome on left [G56.22]     * Pre-op testing [Z01.818]    Procedure(s) (LRB):  RELEASE, CUBITAL TUNNEL (Left)    Anesthesia: Choice    Operative Findings: scarred down ulnar nerve, significant edema, scarring    Estimated Blood Loss: * No values recorded between 12/14/2023  7:31 AM and 12/14/2023  8:30 AM *         Specimens:   Specimen (24h ago, onward)      None              Discharge Note    OUTCOME: Patient tolerated treatment/procedure well without complication and is now ready for discharge.    DISPOSITION: Home or Self Care    FINAL DIAGNOSIS:  <principal problem not specified>    FOLLOWUP: In clinic    DISCHARGE INSTRUCTIONS:  No discharge procedures on file.

## 2023-12-14 NOTE — PLAN OF CARE
Patient prepared for procedure.  No questions at this time.  Family set up for text messages.  Belongings placed in preop cabinet.

## 2023-12-14 NOTE — ANESTHESIA PREPROCEDURE EVALUATION
12/14/2023  Giorgi Matias is a 53 y.o., male.      Pre-op Assessment    I have reviewed the Patient Summary Reports.     I have reviewed the Nursing Notes. I have reviewed the NPO Status.   I have reviewed the Medications.     Review of Systems  Anesthesia Hx:  No problems with previous Anesthesia                Social:  Non-Smoker       Cardiovascular:  Cardiovascular Normal                                            Pulmonary:        Sleep Apnea                Renal/:  Renal/ Normal                 Musculoskeletal:  Arthritis               Neurological:  Neurology Normal                                      Endocrine:  Endocrine Normal          Obesity / BMI > 30      Physical Exam  General: Well nourished, Cooperative, Alert and Oriented    Airway:  Mallampati: II   Mouth Opening: Normal  TM Distance: Normal  Neck ROM: Normal ROM    Anesthesia Plan  Type of Anesthesia, risks & benefits discussed:    Anesthesia Type: Gen ETT, Gen Supraglottic Airway, Gen Natural Airway, MAC  Intra-op Monitoring Plan: Standard ASA Monitors  Post Op Pain Control Plan: multimodal analgesia  Induction:  IV  Airway Plan: Direct, Video and Fiberoptic, Post-Induction  Informed Consent: Informed consent signed with the Patient and all parties understand the risks and agree with anesthesia plan.  All questions answered.   ASA Score: 2    Ready For Surgery From Anesthesia Perspective.   .

## 2023-12-15 ENCOUNTER — PATIENT MESSAGE (OUTPATIENT)
Dept: ORTHOPEDICS | Facility: CLINIC | Age: 53
End: 2023-12-15
Payer: OTHER GOVERNMENT

## 2023-12-15 VITALS
RESPIRATION RATE: 18 BRPM | HEART RATE: 79 BPM | OXYGEN SATURATION: 98 % | HEIGHT: 69 IN | TEMPERATURE: 98 F | WEIGHT: 259.06 LBS | BODY MASS INDEX: 38.37 KG/M2 | DIASTOLIC BLOOD PRESSURE: 73 MMHG | SYSTOLIC BLOOD PRESSURE: 134 MMHG

## 2023-12-15 NOTE — PROGRESS NOTES
12/15/23 Very pleased with care given. States all staff were great.  States he has read and understands all discharge instructions.  Says his Ace wrap is tight and uncomfortable - instructed to loosen Ace wrap to be supportive but comfortable.  Ice as needed marissa 2 hours for 15-20 minutes. Will call MD for any needs.

## 2023-12-15 NOTE — TELEPHONE ENCOUNTER
Left message with Verenice Goldberg, requesting call back. Sent last clinic note and op note to given fax number. No forms received since 11/22/2023 and those were faxed back to company 11/30/2023. Will refax that information again with the recent post op note to given number.

## 2023-12-18 ENCOUNTER — CLINICAL SUPPORT (OUTPATIENT)
Dept: REHABILITATION | Facility: HOSPITAL | Age: 53
End: 2023-12-18
Attending: ORTHOPAEDIC SURGERY
Payer: COMMERCIAL

## 2023-12-18 DIAGNOSIS — G56.22 CUBITAL TUNNEL SYNDROME ON LEFT: ICD-10-CM

## 2023-12-18 DIAGNOSIS — M77.02 MEDIAL EPICONDYLITIS OF LEFT ELBOW: ICD-10-CM

## 2023-12-18 DIAGNOSIS — M25.622 ELBOW STIFFNESS, LEFT: Primary | ICD-10-CM

## 2023-12-18 PROCEDURE — 97530 THERAPEUTIC ACTIVITIES: CPT | Mod: PN

## 2023-12-18 PROCEDURE — 97165 OT EVAL LOW COMPLEX 30 MIN: CPT | Mod: PN

## 2023-12-18 NOTE — PLAN OF CARE
[facility] Occupational Therapy  Initial Evaluation   Date: 12/18/2023  Name: Giorgi Matias  [facility] Number: [id number]  Therapy Diagnosis:   Encounter Diagnoses   Name Primary?    Medial epicondylitis of left elbow     Cubital tunnel syndrome on left    Physician: Jon Mauro MD  Physician Orders: eval and treat   Medical Diagnosis: M77.02 (ICD-10-CM) - Medial epicondylitis of left elbow G56.22 (ICD-10-CM) - Cubital tunnel syndrome on left  Surgical Procedure and Date: 12/14  Evaluation Date: 12/18/2023  Insurance Authorization Period Expiration: 12/04/24  Plan of Care Certification Period: 2/29/24  Date of Return to MD: 12/29/23  Visit # / Visits authorized: 1/12  Time In:1:47  Time Out: 2:45  Total Billable Time: 10 minutes    Precautions:  Standard  post op gentle ROM mid range    Subjective   Patient states: Tight feels like something loose in the elbow. I'm wiggling my fingers.  Feels like arm and fingers are swollen.  Nerve thumping.  Hard to sleep. Have to get help to do things. Not bending my elbow, afraid to pop stitches.  Involved Side: left   Dominant Side: Right  Date of Onset: September 2023  History of Current Condition/Mechanism of Injury: felt pop in elbow when un/loading a tire in Sept 2023  Imaging: none   Previous Therapy: prior to surgery with no relief of symptoms    Past Medical History/Physical Systems Review:    has a past medical history of Arthritis, Sleep apnea, Wears glasses, and Wears partial dentures.     has a past surgical history that includes Shoulder surgery; Carpal tunnel release (Bilateral); Shoulder arthroscopy w/ rotator cuff repair (Right); Correction of hammer toe (Left, 10/30/2020); Endoscopic plantar fasciotomy (Left, 10/30/2020); Surgical removal of fascia (Left, 9/24/2021); Injection of anesthetic agent around medial branch nerves innervating lumbar facet joint (Bilateral, 2/7/2022); and Ulnar tunnel release (Left, 12/14/2023).  has a current  medication list which includes the following prescription(s): bupropion, cholecalciferol (vitamin d3), diclofenac sodium, ibuprofen, mirtazapine, ondansetron, oxycodone-acetaminophen, rosuvastatin, and trazodone, and the following Facility-Administered Medications: electrolyte-s (ph 7.4), electrolyte-s (ph 7.4), lidocaine (pf) 10 mg/ml (1%), and ondansetron.    Review of patient's allergies indicates:   Allergen Reactions    Shellfish containing products Other (See Comments)     Reports reaction to crawfish and shrimp. Reports chest tightness and itching. Unknown if allergic to iodine or IV contrast Dye.        Patient's Goals for Therapy: able to use arm normally  get back to work    Pain:  Functional Pain Scale Rating 0-10:  0/10 at least  8/10 at worst  Location: left elbow  Description: pulling  Aggravating Factors: movement  Easing Factors: pain medication  taking every 6 hours    Occupation:  tire delivery  Working presently: out on leave since injury    Functional Limitations/Social History:  Previous functional status includes: Independent with all self care and home management and job tasks.  Current Functional Status   Home/Living environment : lives with his family      ADL Assessment  ADL    Dressing With compensation able to manage clothing    Eating Independent post set up   Cooking unable   Bathing/Grooming Family assisting   Household tasks unable   By patient report  unable to perform job tasks             FOTO score: eval 27%   Predicted Goal  56%  Objective   Observation:  Most distal aspect of would with small amount of bloody drainage. Elbow area appears irritated with slight redness and patient with complaints of itching.  Palpation: mild tenderness throughout surgical site    Edema circumferential measurement at elbow crease  left 34 cm  right    33.2  Elbow -   Range Of Motion           12/18/2023                   Motion AROM Right AROM Left   Flexion 130 90   Extension 10 dg lag 25    Supination 45 35   Pronation 65 65     Wrist -  Range Of Motion      12/18/2023                 Motion AROM Right AROM Left   Flexion 45 45   Extension 45 45   Ulnar Deviation NT 30   Radial Deviation NT 20   Patient exhibiting full AROM at thumb and fingers     Treatment   Treatment Time In: 2:35  Treatment Time Out: 2:45  Total Treatment time separate from Evaluation time:10 minutes  Patient participated in dynamic functional therapeutic activities for 10 mins to improve functional performance including:  -post op dressing was removed arm with cleaned with antibacterial soap an water. A non adherent bandage was applied to incision site and a Size E tubigrip was fitted from hand to mid upper arm. He was instructed to have his wife inspect bandage and change if needed. He was instructed to remove tubigrip sleeve if any signs of too much compression  increase in pain or swelling.    -Mid active range of elbow performed, full wrist AROM and fisting and fingers extension and abduction and adduction performed.   -Emphasized no tight gripping, lifting, pushing or pulling with the left arm. Encouraged mid range elbow flexion and extension 5 repetitions at least every other hour.    Home Exercise Program/Education:  Issued HEP (see patient instructions in EMR) and educated on modality use for pain management . Exercises were reviewed and he was able to demonstrate them prior to the end of the session. Pt received a written copy of exercises to perform at home. He demonstrated good  understanding of the education provided.  Pt was advised to perform these exercises only within mid range as performed in therapy  Patient Education: role of OT, goals for OT, scheduling/cancellations - pt verbalized understanding. Discussed insurance limitations with patient.    Assessment     Patient is a 53 y.o. right handed male presenting to skilled OT with diagnosis of status post left cubital tunnel release on 12/14/23 with injury in  September 2023 as moving a tire. Patient with pain of 0/10 to 8/10 described as pulling.  He is tender to palpation throughout surgical site.  He has mild swelling throughout elbow distally. His AROM is as allowed at this time post surgery. Minimal deficit noted in supination only distally.  He reports assist with self care and unable top perform daily activities. His FOTO score: 27%.  A brief history was obtained from the patient and MD note.  During the evaluation, the patient required minimal assistance to manage sweatshirt off.  There are no anticipated barriers/co-morbid conditions/personal factors may affect the plan of care.   Patient will benefit from OT to address problems hindering functional use of UE. The following goals were discussed with the patient and patient is in agreement with them as to be addressed in the treatment plan. The patient's rehab potential is Good. Patient's educational, spiritual, cultural needs were considered.       Goals:   Short Term Goals  Independent in home program of ROM in 2 visits  2. Patient with increase in elbow flexion to ~ 120 dgs to allow for greater ease with self care in 3 weeks  3. Patient with increase in ROM and decrease in pain to 5/10 at worst allowing for light kitchen tasks in 4 weeks  Long Term Goals  Patient with full AROM at elbow and decrease in pain to 3/10 allowing for lifting of 8# with no increase in symptoms in 6 weeks  Patient with full AROM decrease in pain to 1/10 and able to perform moderate resistive activities in 8 weeks  3. Improve FOTO score by 20% indicating improved function of left arm in 8 weeks  Plan   Outpatient Occupational Therapy 2 times weekly for 6 weeks then may decrease to 1 time a week for 2 weeks (will reassess periodically and adjust as needed) to include the following interventions: . Home Exercise and Stretching, Patient Education, Therapeutic Exercise (85397) - Improve muscle strength, ROM, flexibility and muscle function,  Manual Stretching (85005) - Passive or Active stretching to improve muscle length and function, Soft Tissue Mobs (92816) - Increase ROM tissue length, joint mechanics and modulate pain, Cryotherapy (27274) - Application of cold to decrease local swelling and decrease pain , Heat (59059) -  Application of heat to increase local circulation and decrease pain, Therapeutic activities (68789) use of dynamic activities to improve functional performance, Kinesio taping.      RUDY Muñiz

## 2023-12-21 ENCOUNTER — CLINICAL SUPPORT (OUTPATIENT)
Dept: REHABILITATION | Facility: HOSPITAL | Age: 53
End: 2023-12-21
Payer: COMMERCIAL

## 2023-12-21 DIAGNOSIS — M25.622 ELBOW STIFFNESS, LEFT: Primary | ICD-10-CM

## 2023-12-21 DIAGNOSIS — M25.522 LEFT ELBOW PAIN: ICD-10-CM

## 2023-12-21 PROCEDURE — 97110 THERAPEUTIC EXERCISES: CPT | Mod: PN

## 2023-12-21 PROCEDURE — 97140 MANUAL THERAPY 1/> REGIONS: CPT | Mod: PN

## 2023-12-21 NOTE — PROGRESS NOTES
[facility] Daily Treatment Note   Date: 12/21/2023  Name: Giorgi Matias  [facility] Number: [id number]  Therapy Diagnosis:   Encounter Diagnosis   Name Primary?    Left elbow pain Yes     Physician: Jon Mauro MD  Physician Orders: eval and treat   Medical Diagnosis: M77.02 (ICD-10-CM) - Medial epicondylitis of left elbow G56.22 (ICD-10-CM) - Cubital tunnel syndrome on left  Surgical Procedure and Date: 12/14  Evaluation Date: 12/18/2023  Insurance Authorization Period Expiration: 12/04/24  Plan of Care Certification Period: 2/29/24  Date of Return to MD: 12/29/23  Visit # / Visits authorized: 2/12  Time In:1:00  Time Out: 1:40  Total Billable Time: 40 minutes    Precautions:  Standard   post op    Subjective   Pt reports: The gauze came off last night. The sleeve works fine. It's still popping like before surgery. Went to bring my hand to my head, can't do it.   was compliant with home exercise program given    Response to previous treatment: unremarkable  Functional change:none      Objective    Patient received the following treatment:   Manual therapy techniques to increase joint mobilization and soft tissue mobilization for 30 minutes including:    -Soft tissue mobilization to  left arm while in supine with arm elevated for 30 minutes  to increase tissue elasticity and decrease swelling and pain      Therapeutic exercises to improve functional performance while increasing strength, endurance, ROM,  and flexibility  for 9  minutes,    including:   -Active range of motion with therapist supporting arm while in supine shoulder flexion, elbow flexion and extension, forearm supination and pronation at ~ 80 degrees of elbow flexion and combined elbow flexion with supination and extension with pronation in mid range, and wrist flexion and extension       Instruction and demonstration given of all introduced   Patient required minimal cuing for correct performance of  exercise.    Home Exercises and Education Provided   Education provided:  - discussed insurance limitation  - Progress towards goals   12/21/23 Emphasized importance of mid range Active range of motion only (demonstrated no greater than ~ 90 degrees of elbow flexion) and no lifting, push, pull with the arm   Written Home Exercises Provided: Patient instructed to cont prior HEP.  Exercises were reviewed and he was able to demonstrate them prior to the end of the session. he demonstrated good  understanding of the HEP provided.     Assessment   Patient reporting experiencing popping at his elbow as prior to surgery. No drainage noted at elbow. Irritation-rash like- just proximal to surgical site at medial volar aspect of upper arm. Emphasized again mid range motion only. Pt  will continue to benefit from skilled OT to further address pain and deficits in ROM and strength which are hindering ability to perform self care and IADLs. Patient's cultural,spiritual, and educational needs were considered    Goals:  Short Term Goals  Independent in home program of ROM in 2 visits-met  2. Patient with increase in elbow flexion to ~ 120 degrees to allow for greater ease with self care in 3 weeks  3. Patient with increase in ROM and decrease in pain to 5/10 at worst allowing for light kitchen tasks in 4 weeks  Plan   Continue skilled therapy 2 times a week for 6 weeks then may decrease to 1 time a week for 2 weeks including therapeutic exercises and activities, manual therapy, and pain modalities  as needed   next session: increase range of motion as tolerated    RUDY Muñiz

## 2023-12-29 ENCOUNTER — OFFICE VISIT (OUTPATIENT)
Dept: ORTHOPEDICS | Facility: CLINIC | Age: 53
End: 2023-12-29
Payer: COMMERCIAL

## 2023-12-29 VITALS — WEIGHT: 259 LBS | BODY MASS INDEX: 38.36 KG/M2 | HEIGHT: 69 IN

## 2023-12-29 DIAGNOSIS — G56.22 CUBITAL TUNNEL SYNDROME ON LEFT: Primary | ICD-10-CM

## 2023-12-29 PROCEDURE — 99024 PR POST-OP FOLLOW-UP VISIT: ICD-10-PCS | Mod: S$GLB,,, | Performed by: ORTHOPAEDIC SURGERY

## 2023-12-29 PROCEDURE — 99999 PR PBB SHADOW E&M-EST. PATIENT-LVL II: ICD-10-PCS | Mod: PBBFAC,,, | Performed by: ORTHOPAEDIC SURGERY

## 2023-12-29 PROCEDURE — 99024 POSTOP FOLLOW-UP VISIT: CPT | Mod: S$GLB,,, | Performed by: ORTHOPAEDIC SURGERY

## 2023-12-29 PROCEDURE — 99999 PR PBB SHADOW E&M-EST. PATIENT-LVL II: CPT | Mod: PBBFAC,,, | Performed by: ORTHOPAEDIC SURGERY

## 2023-12-29 NOTE — PROGRESS NOTES
Post-op Note    HPI    Giorgi Matias is here 2 weeks s/p the following procedure:       Left ulnar nerve decompression    Overall doing well. Pain controlled on current regimen.  Started physical therapy.  Still reports pain in his elbow but not necessarily in his finger tips.  Having some nerve type symptoms.        Physical Exam:     Patient is alert and oriented no acute distress.   Assistive Device:  none     Left elbow Incision(s) are well healed.  There is no evidence of dehiscence.  There is no induration erythema or signs of infection.  Appropriate soft tissue swelling.  Compartments are soft and compressible.  Warm well-perfused extremity. Mild bruising of the medial elbow.  Able to make composite fist.  AI and PI and ulnar nerve intact.  Range of motion 10° of extension 120 flexion.  Stable to varus and valgus stress.    Imaging:     I have personally reviewed the following imaging and these are an interpretation of my findings:     None    Assessment    Giorgi Matias is 2 weeks Post-op     Plan:    Overall doing as expected.  We discussed expectations of surgery and postoperative course.     Pain: Continued postoperative pain regimen --   Tylenol/ibuprofen  PT/OT: Continue/Initiate physical therapy (weight bearing status:  nonweightbearing for 4 more weeks),  okay for range of motion and nerve glides.  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     Follow-up: 4 weeks   X-rays next visit:   None

## 2023-12-29 NOTE — LETTER
December 29, 2023    Giorgi Matias  [address] [facility] [address]         [address]  [facility] [address]  Phone: [phone] December 29, 2023     Patient: Giorgi Matias   YOB: 1970   Date of Visit: 12/29/2023       To Whom It May Concern:    It is my medical opinion that Giorgi Matias should remain out of work until 02/05/2024 . Mr. Matias has a follow up appointment 01/26/2024 to discuss work status. If anythign changes, a note will be sent at that time.     If you have any questions or concerns, please don't hesitate to call.    Sincerely,        Jon Mauro MD

## 2024-01-03 ENCOUNTER — CLINICAL SUPPORT (OUTPATIENT)
Dept: REHABILITATION | Facility: HOSPITAL | Age: 54
End: 2024-01-03
Payer: COMMERCIAL

## 2024-01-03 DIAGNOSIS — M25.522 LEFT ELBOW PAIN: Primary | ICD-10-CM

## 2024-01-03 PROCEDURE — 97140 MANUAL THERAPY 1/> REGIONS: CPT | Mod: PN

## 2024-01-03 PROCEDURE — 97110 THERAPEUTIC EXERCISES: CPT | Mod: PN

## 2024-01-03 NOTE — PROGRESS NOTES
[facility] Daily Treatment Note   Date: 1/3/2024  Name: Giorgi Matias  Clinic Number: [id number]  Therapy Diagnosis: left elbow decreased ROM and pain  Physician: Jon Mauro MD  Physician Orders: eval and treat   Medical Diagnosis: M77.02 (ICD-10-CM) - Medial epicondylitis of left elbow G56.22 (ICD-10-CM) - Cubital tunnel syndrome on left  Surgical Procedure and Date: 12/14  Evaluation Date: 12/18/2023  Insurance Authorization Period Expiration: 12/04/24  Plan of Care Certification Period: 2/29/24  Date of Return to MD: 12/29/23  Visit # / Visits authorized: 3/12   Time In: 8:24  Time Out: 9:03  Total Billable Time: 39 minutes    Precautions:  Standard   post op    Subjective   Pt reports: Doctor said the popping might because of arthritis. It's still sensitive and get twinges at times. Able to use my arm to help get dressed, can't reach all the way up behind my back. Still feel a twinge in the elbow when I turn it a certain way (appeared to imply pronation).    was compliant with home exercise program given    Response to previous treatment: unremarkable  Functional change: none    Objective   Active range of motion Left Elbow flexion 120 (+30)  extension 10 dg lag (+15)     Patient received the following treatment:   Manual therapy techniques to increase joint mobilization and soft tissue mobilization for 25 minutes including:    -Soft tissue mobilization to  left arm while in supine with arm elevated for 30 minutes  to increase tissue elasticity and decrease swelling and pain    -ulnar nerve glides  Therapeutic exercises to improve functional performance while increasing strength, endurance, ROM,  and flexibility  for 10 minutes,    including:   -Active range of motion with therapist supporting arm while in supine shoulder flexion, elbow flexion and extension, forearm supination and pronation at ~ 80 degrees of elbow flexion and combined elbow flexion with  supination and extension with pronation, and wrist flexion and extension   -UBE level 1   3 minutes each forward and backward   noted more tension in elbow with back pedaling  Therapeutic activities to improve functional performance with use of dynamic activities for 4 minutes including:  -in supine reaching into ulnar glide position to retrieve block from therapist (arm supported at ~ 90 degrees of abduction) extended and pronated to release into a container on stool x 20 repetitions     Instruction and demonstration given of all introduced   Patient required minimal cuing for correct performance of exercise.    Home Exercises and Education Provided   Education provided:  - discussed insurance limitation  - Progress towards goals   Written Home Exercises Provided: 1/03/24 Full ROM encouraged and light use of left arm for daily activities. Continue with performing ulnar nerve glide and massage surgical site varying pressures as tolerated.  Patient instructed to cont prior HEP. Exercises were reviewed and he was able to demonstrate them prior to the end of the session. he demonstrated good  understanding of the HEP provided.     Assessment   Patient noted popping in the elbow frequently, which doctor thought may due to arthritis. He notes increase in use of left for self care with some limitation reaching behind back. His Active range of motion has increased 15 degrees in extension and 30 degrees in flexion with only minimal limitation persisting in flexion as compared to the right. He noted increase in pressure in the elbow with repetitive flexion and extension and a twinge with pronation. By end of session noted very sensitive along ulnar aspect of forearm. Pt  will continue to benefit from skilled OT to further address pain and deficits in ROM and strength which are hindering ability to perform self care and IADLs. Patient's cultural,spiritual, and educational needs were considered    Goals:  Short Term  Goals  Independent in home program of ROM in 2 visits-met  2. Patient with increase in elbow flexion to ~ 120 degrees to allow for greater ease with self care in 3 weeks  3. Patient with increase in ROM and decrease in pain to 5/10 at worst allowing for light kitchen tasks in 4 weeks  Plan   Continue skilled therapy 2 times a week for 5 weeks then may decrease to 1 time a week for 2 weeks including therapeutic exercises and activities, manual therapy, and pain modalities  as needed   next session: hand strengthening,  and intrinsics    RUDY Muñiz

## 2024-01-10 ENCOUNTER — CLINICAL SUPPORT (OUTPATIENT)
Dept: REHABILITATION | Facility: HOSPITAL | Age: 54
End: 2024-01-10
Payer: COMMERCIAL

## 2024-01-10 DIAGNOSIS — M25.522 LEFT ELBOW PAIN: Primary | ICD-10-CM

## 2024-01-10 PROCEDURE — 97140 MANUAL THERAPY 1/> REGIONS: CPT | Mod: PN

## 2024-01-10 PROCEDURE — 97110 THERAPEUTIC EXERCISES: CPT | Mod: PN

## 2024-01-10 NOTE — PROGRESS NOTES
[facility] Daily Treatment Note   Date: 1/10/2024  Name: Giorgi Matias  Clinic Number: [id number]  Therapy Diagnosis: left elbow decreased ROM and pain  Physician: Jon Mauro MD  Physician Orders: eval and treat   Medical Diagnosis: M77.02 (ICD-10-CM) - Medial epicondylitis of left elbow G56.22 (ICD-10-CM) - Cubital tunnel syndrome on left  Surgical Procedure and Date: 12/14/23  left cubital tunnel release  Evaluation Date: 12/18/2023  Insurance Authorization Period Expiration: 12/04/24  Plan of Care Certification Period: 2/29/24  Date of Return to MD: 12/29/23  Visit # / Visits authorized: 4/12   Time In: 8:27  Time Out: 9:06  Total Billable Time: 39 minutes    Precautions:  Standard   post op    Subjective   Pt reports: Elbow is still very sensitive. Can't put it down on anything. Daughter went to get my attention and hit my arm. Not good.    was compliant with home exercise program given    Response to previous treatment: unremarkable  Functional change: none    Objective   1/03/24  Active range of motion Left Elbow flexion 120 (+30)  extension 10 dg lag (+15)     Patient received the following treatment:   Manual therapy techniques to increase joint mobilization and soft tissue mobilization for 11 minutes including:    -Soft tissue mobilization to  left arm while in supine with arm elevated for 30 minutes  to increase tissue elasticity and decrease swelling and pain    -ulnar nerve glides  Therapeutic exercises to improve functional performance while increasing strength, endurance, ROM,  and flexibility  for 28 minutes,  including:   -Active range of motion with therapist supporting arm while in supine shoulder flexion, elbow flexion and extension, forearm supination and pronation at ~ 80 degrees of elbow flexion and combined elbow flexion with supination and extension with pronation, and wrist flexion and extension   -UBE level 2 (increased by 1 level)   3  minutes each forward and backward     ADDED: RED theraputty gripping 2 x 1 minute, rolled out emphasizing elbow flexion and extension with mild to moderate, pinching with 3 jaw alber and lumbrical pull  2 x 10 repetitions each   ADDED: sponge squeezes each web 20 repetitions each   ADDED: red rubberband little and index abduction 2 x 10 repetitions each soreness post 10 repetitions, thumb abduction 20 repetitions    Kinesio tape placed over scar  I strip with 20% tension to mobilize scar and decrease hypersensitivity    Instruction and demonstration given of all introduced   Patient required minimal cuing for correct performance of exercise.    Home Exercises and Education Provided   Education provided:  - discussed insurance limitation  - Progress towards goals   Written Home Exercises Provided: 1/10/24 Reviewed Kinesi tape guidelines: care (bathe and pat dry), wear (up to 5 days), precautions and removal (remove immediately if any signs of irritation or increase in symptoms by thoroughly wetting with soap and water or baby oil and roll back on itself). 1/03/24 Full ROM encouraged and light use of left arm for daily activities. Continue with performing ulnar nerve glide and massage surgical site varying pressures as tolerated.  Patient instructed to cont prior HEP. Exercises were reviewed and he was able to demonstrate them prior to the end of the session. he demonstrated good  understanding of the HEP provided.     Assessment   Patient continues with great amount of hypersensitivity at medial elbow; not along forearm. He noted minimal tingling at ring and little fingers with scar mobilization and ulnar nerve glides. He reported soreness at hand and volar forearm with hand strengthening exercises. Patient knowledgeable of Kinesio tape guidelines. Pt  will continue to benefit from skilled OT to further address pain and deficits in ROM and strength which are hindering ability to perform self care and IADLs. Patient's  cultural,spiritual, and educational needs were considered    Goals:  Short Term Goals  Independent in home program of ROM in 2 visits-met  2. Patient with increase in elbow flexion to ~ 120 degrees to allow for greater ease with self care in 3 weeks-met  3. Patient with increase in ROM and decrease in pain to 5/10 at worst allowing for light kitchen tasks in 4 weeks  Plan   Continue skilled therapy 2 times a week for 5 weeks then may decrease to 1 time a week for 2 weeks including therapeutic exercises and activities, manual therapy, and pain modalities  as needed   next session:    RUDY Muñiz

## 2024-01-12 ENCOUNTER — CLINICAL SUPPORT (OUTPATIENT)
Dept: REHABILITATION | Facility: HOSPITAL | Age: 54
End: 2024-01-12
Payer: COMMERCIAL

## 2024-01-12 DIAGNOSIS — S53.442A ELBOW SPRAIN, ULNAR COLLATERAL LIGAMENT, LEFT, INITIAL ENCOUNTER: Primary | ICD-10-CM

## 2024-01-12 DIAGNOSIS — M77.02 MEDIAL EPICONDYLITIS OF LEFT ELBOW: ICD-10-CM

## 2024-01-12 PROCEDURE — 97110 THERAPEUTIC EXERCISES: CPT | Mod: PN

## 2024-01-12 PROCEDURE — 97140 MANUAL THERAPY 1/> REGIONS: CPT | Mod: PN

## 2024-01-12 NOTE — PROGRESS NOTES
[facility] Daily Treatment Note   Date: 1/12/2024  Name: Giorgi Matias  [facility] Number: [id number]  Therapy Diagnosis: left elbow decreased ROM and pain  Physician: Jon Mauro MD  Physician Orders: eval and treat   Medical Diagnosis: M77.02 (ICD-10-CM) - Medial epicondylitis of left elbow G56.22 (ICD-10-CM) - Cubital tunnel syndrome on left  Surgical Procedure and Date: 12/14/23  left cubital tunnel release  Evaluation Date: 12/18/2023  Insurance Authorization Period Expiration: 12/04/24  Plan of Care Certification Period: 2/29/24  Date of Return to MD: 1/26/24  Visit # / Visits authorized: 5/12   Time In: 8:50  Time Out: 9:30  Total Billable Time: 40 minutes    Precautions:  Standard   post op    Subjective   Pt reports: Tape came off not sure how or when. Noticed it sometime yesterday. Get a pop in my elbow when I pull the cover on or off of me. Still getting that electrical pain sometimes can't let it rest on anything.   was compliant with home exercise program given    Response to previous treatment: unremarkable  Functional change: none    Objective   1/03/24  Active range of motion Left Elbow flexion 120 (+30)  extension 10 dg lag (+15)     Patient received the following treatment:   Manual therapy techniques to increase joint mobilization and soft tissue mobilization for 11 minutes including:    -Soft tissue mobilization to  left arm while in supine with arm elevated for 10 minutes  to increase tissue elasticity and decrease swelling and pain    -ulnar nerve glides  Therapeutic exercises to improve functional performance while increasing strength, endurance, ROM,  and flexibility  for 29 minutes,  including:   -Active range of motion with therapist supporting arm while in supine shoulder flexion, elbow flexion and extension, forearm supination and pronation at ~ 80 degrees of elbow flexion and combined elbow flexion with supination and extension with  pronation, and wrist flexion and extension   -UBE level 2   3 minutes each forward and backward     - RED theraputty gripping 2 x 1 minute, rolled out emphasizing elbow flexion and extension with mild to moderate, pinching with 3 jaw alber and lumbrical pull  2 x 10 repetitions each   - sponge squeezes each web 20 repetitions each   - red rubberband little and index abduction 2 x 10 repetitions each soreness post 10 repetitions, thumb abduction 20 repetitions    Kinesio tape placed over scar  I strip with 20% tension to mobilize scar and decrease hypersensitivity    Instruction and demonstration given of all introduced   Patient required minimal cuing for correct performance of exercise.    Home Exercises and Education Provided   Education provided:  - discussed insurance limitation  - Progress towards goals   Written Home Exercises Provided: 1/10/24 Reviewed Kinesio tape guidelines: care (bathe and pat dry), wear (up to 5 days), precautions and removal (remove immediately if any signs of irritation or increase in symptoms by thoroughly wetting with soap and water or baby oil and roll back on itself). 1/03/24 Full ROM encouraged and light use of left arm for daily activities. Continue with performing ulnar nerve glide and massage surgical site varying pressures as tolerated.  Patient instructed to cont prior HEP. Exercises were reviewed and he was able to demonstrate them prior to the end of the session. he demonstrated good  understanding of the HEP provided.     Assessment   Twice during scar mobilization patient jumped indicating shock to fingers. He noted continues with a popping in his elbow at times.  Reports of weakness with hand strengthening exercise. Onset of dorsal forearm tension and pain with little finger abduction exercise.  Pt  will continue to benefit from skilled OT to further address pain and deficits in ROM and strength which are hindering ability to perform self care and IADLs. Patient's  cultural,spiritual, and educational needs were considered    Goals:  Short Term Goals  Independent in home program of ROM in 2 visits-met  2. Patient with increase in elbow flexion to ~ 120 degrees to allow for greater ease with self care in 3 weeks-met  3. Patient with increase in ROM and decrease in pain to 5/10 at worst allowing for light kitchen tasks in 4 weeks  Plan   Continue skilled therapy 2 times a week for 4 weeks then may decrease to 1 time a week for 2 weeks including therapeutic exercises and activities, manual therapy, and pain modalities  as needed   next session:RUDY Burns

## 2024-01-30 ENCOUNTER — DOCUMENTATION ONLY (OUTPATIENT)
Dept: REHABILITATION | Facility: HOSPITAL | Age: 54
End: 2024-01-30
Payer: OTHER GOVERNMENT

## 2024-01-30 NOTE — PROGRESS NOTES
"Contacted patient. He stated that he was told that he only had 12 visits. When therapist told him that he only completed 8 visits, he stated that "they" were calculating his charges to make sure everything is paid. He has a  and working on a settlement. As far as he knew his therapy was over. When questioned about how his arm was feeling he noted that it was the same with the nerve being sensitive.  Patient being discharged per his request. RUDY Muñiz    "

## 2024-01-30 NOTE — PROGRESS NOTES
[facility]  Discharge Note   Date: 1/30/2024  last seen  1/12/24  Name: Giorgi Matias  Clinic Number: [id number]  Physician: Jon Mauro MD  Medical Diagnosis: M77.02 (ICD-10-CM) - Medial epicondylitis of left elbow G56.22 (ICD-10-CM) - Cubital tunnel syndrome on left  on 12/14 underwent release  Evaluation Date: 12/18/2023  Visits: 5 visits  Subjective   Patient states:Can't put it down on anything. Still get popping sometimes and that electrical shock  Pain:    on eval   Functional Pain Scale Rating 0-10:  0/10 at least  8/10 at worst  Location: left elbow  Description: pulling     Functional Limitations/Social History:  Previous functional status includes: Independent with all self care and home management and job tasks.  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                  Objective     Elbow -   Range Of Motion         1/03/24 12/18/2023                   Motion AROM  Left   AROM Right AROM Left   Flexion 120 130 90   Extension 10 dg lag 10 dg lag 25   Supination 45 45 35   Pronation 65 65 65      Wrist -  Range Of Motion      12/18/2023                 Motion AROM Right AROM Left   Flexion 45 45   Extension 45 45   Ulnar Deviation NT 30   Radial Deviation NT 20   Patient exhibiting full AROM at thumb and fingers  Assessment      On eval patient with pain of 0/10 to 8/10 described as pulling.  He continues to be tender to palpation at elbow. Unable to rest elbow on surfaces. His AROM improved to WNL as compared to the right.  On eval he reported assist with self care and unable to perform daily  activities. He reported getting better with self care activities. During last session: twice during scar mobilization patient jumped indicating shock to fingers. He noted continues with a popping in his elbow at times.   Goals:  Not MET  Short Term Goals  Independent in home program of ROM in 2 visits-met  2. Patient with increase in elbow flexion to ~ 120 dgs to allow for greater ease with self care in 3 weeks  3. Patient with increase in ROM and decrease in pain to 5/10 at worst allowing for light kitchen tasks in 4 weeks  Long Term Goals  Patient with full AROM at elbow and decrease in pain to 3/10 allowing for lifting of 8# with no increase in symptoms in 6 weeks  Patient with full AROM decrease in pain to 1/10 and able to perform moderate resistive activities in 8 weeks  3. Improve FOTO score by 20% indicating improved function of left arm in 8 weeks  Plan   Discharge from skilled Outpatient Occupational Therapy per patient request-see doc only of telephone conversation on 1/30.  RUDY Muñiz

## 2024-12-31 ENCOUNTER — OCCUPATIONAL HEALTH (OUTPATIENT)
Dept: URGENT CARE | Facility: CLINIC | Age: 54
End: 2024-12-31

## 2024-12-31 PROCEDURE — 99499 UNLISTED E&M SERVICE: CPT | Mod: S$GLB,,,

## 2025-02-17 ENCOUNTER — OFFICE VISIT (OUTPATIENT)
Dept: URGENT CARE | Facility: CLINIC | Age: 55
End: 2025-02-17
Payer: OTHER MISCELLANEOUS

## 2025-02-17 VITALS
HEIGHT: 69 IN | SYSTOLIC BLOOD PRESSURE: 132 MMHG | RESPIRATION RATE: 20 BRPM | BODY MASS INDEX: 38.06 KG/M2 | OXYGEN SATURATION: 98 % | WEIGHT: 257 LBS | DIASTOLIC BLOOD PRESSURE: 79 MMHG | HEART RATE: 66 BPM | TEMPERATURE: 98 F

## 2025-02-17 DIAGNOSIS — Z02.6 ENCOUNTER RELATED TO WORKER'S COMPENSATION CLAIM: Primary | ICD-10-CM

## 2025-02-17 DIAGNOSIS — S61.237A PUNCTURE WOUND OF LEFT LITTLE FINGER: ICD-10-CM

## 2025-02-17 DIAGNOSIS — S69.92XA FINGER INJURY, LEFT, INITIAL ENCOUNTER: ICD-10-CM

## 2025-02-17 RX ORDER — CEPHALEXIN 500 MG/1
500 CAPSULE ORAL 4 TIMES DAILY
Qty: 28 CAPSULE | Refills: 0 | Status: SHIPPED | OUTPATIENT
Start: 2025-02-17 | End: 2025-02-17

## 2025-02-17 RX ORDER — CEPHALEXIN 500 MG/1
500 CAPSULE ORAL 4 TIMES DAILY
Qty: 28 CAPSULE | Refills: 0 | Status: SHIPPED | OUTPATIENT
Start: 2025-02-17 | End: 2025-02-24

## 2025-02-17 NOTE — PROGRESS NOTES
"Subjective:      Patient ID: Giorgi Matias is a 55 y.o. male.    Vitals:  height is 5' 8.5" (1.74 m) and weight is 116.6 kg (257 lb). His oral temperature is 97.7 °F (36.5 °C). His blood pressure is 132/79 and his pulse is 66. His respiration is 20 and oxygen saturation is 98%.     Chief Complaint: Work Related Injury    WC/NV/DOI: 02/14/2025:  55-year-old male presents for evaluation of left index finger injury at the D IP joint.  He reports on Friday he was using a staple gun at work and accidentally hit his left index finger right at the D IP joint on the dorsal side of the finger.  He reports he was able to pull the staple out but did not checked to see if it was intact at time incident.  He reports there was bleeding.  Since the incident on Friday the finger has swollen and he has limited range of motion at the D IP joint of the finger.  There does not appear to be any warmth, drainage or, erythema to the area but there is significant swelling to the finger.  MCP joint mobility is intact.  [facility] tetanus [facility] in 2021 [facility].    Hand Injury   His dominant hand is their right hand. The incident occurred 6 to 12 hours ago. The incident occurred at work. The injury mechanism was a direct blow. The pain is present in the right fingers. The pain does not radiate. The pain is at a severity of 9/10. The pain is moderate. The pain has been Constant since the incident. Associated symptoms comments: Stinging . Nothing aggravates the symptoms. He has tried rest for the symptoms. The treatment provided moderate relief.       Constitution: Negative for chills, fatigue and fever.   Musculoskeletal:  Positive for joint pain, joint swelling and abnormal ROM of joint.   Skin:  Positive for puncture wound. Negative for erythema.      Objective:     Physical Exam   Constitutional: He is oriented to person, place, and time. He appears well-developed.   HENT:   Head: Normocephalic and atraumatic. Head is without " abrasion, without contusion and without laceration.   Ears:   Right Ear: External ear normal.   Left Ear: External ear normal.   Nose: Nose normal.   Mouth/Throat: Oropharynx is clear and moist and mucous membranes are normal.   Eyes: Conjunctivae, EOM and lids are normal. Pupils are equal, round, and reactive to light.   Neck: Trachea normal and phonation normal. Neck supple.   Cardiovascular: Normal rate.   Pulmonary/Chest: Effort normal. No respiratory distress.   Musculoskeletal:         General: Swelling, tenderness and signs of injury present. No deformity.      Left hand: Left index finger: Exhibits decreased ROM, swelling and tenderness. Injuries: puncture wound (At the D IP joint). There is tenderness of the DIP joint. Motor /Testing: Index Finger: 0/5.        Hands:    Neurological: He is alert and oriented to person, place, and time. He displays no weakness.   Skin: Skin is warm, dry, intact and no rash. Capillary refill takes less than 2 seconds. No abrasion, No burn, No bruising, No erythema and No ecchymosis   Psychiatric: His speech is normal and behavior is normal. Mood, judgment and thought content normal.   Nursing note and vitals reviewed.      Assessment:     1. Encounter related to worker's compensation claim    2. Finger injury, left, initial encounter    3. Puncture wound of left little finger        Plan:       Encounter related to worker's compensation claim    Finger injury, left, initial encounter  -     XR FINGER 2 OR MORE VIEWS; Future; Expected date: 02/17/2025    Puncture wound of left little finger  -     cephALEXin (KEFLEX) 500 MG capsule; Take 1 capsule (500 mg total) by mouth 4 (four) times daily. for 7 days  Dispense: 28 capsule; Refill: 0        Patient to follow up in 1 week for re-evaluation    Restrictions as listed. Antibiotics as prescribed    No obvious FB or abnormality on XR. Will update with official report.    Discussed medication with patient who acknowledges  understanding and is agreeable to POC. Follow up with primary care. Increase fluid intake. Red flags for ER discussed.

## 2025-02-17 NOTE — PATIENT INSTRUCTIONS
Antibiotics as prescribed    You can work as tolerated.    Warm soaks and daily exercise to help improve range of motion.    Follow up in 7 days or sooner needed    Keep area clean and dry.    You can apply ice and elevate the area for relief of swelling.

## 2025-02-17 NOTE — LETTER
[facility]  [address] [facility] [address]  Phone: [phone]  [facility]: [phone]    Pt Name: Giorgi Matias     Employee ID:  Date of First Treatment: 02/17/2025   Company: [id number]      Appointment Time: 08:30 AM Arrived: 8:53 am   Provider: Mita James NP Time Out:10:55 am     Office Treatment:   1. Encounter related to worker's compensation claim    2. Finger injury, left, initial encounter    3. Puncture wound of left little finger      Medications Ordered This Encounter   Medications    cephALEXin (KEFLEX) 500 MG capsule      Patient Instructions: Attention not to aggravate affected area, Daily home exercises/warm soaks    Restrictions:  (work as tolerated)     Return Appointment: 2-

## (undated) DEVICE — DRESSING XEROFORM NONADH 1X8IN

## (undated) DEVICE — SEE MEDLINE ITEM 152515

## (undated) DEVICE — SUT 4/0 18IN ETHILON BL P3

## (undated) DEVICE — NDL HYPO REG 25G X 1 1/2

## (undated) DEVICE — GLOVE SENSICARE PI GRN 8

## (undated) DEVICE — GAUZE SPONGE 4X4 12PLY

## (undated) DEVICE — SUT 3/0 18IN COATED VICRYLP

## (undated) DEVICE — PAD PREP 50/CA

## (undated) DEVICE — SYR 10CC LUER LOCK

## (undated) DEVICE — SYR 3CC LUER LOC

## (undated) DEVICE — BLADE SURG #15 CARBON STEEL

## (undated) DEVICE — SEE MEDLINE ITEM 152523

## (undated) DEVICE — GLOVE BIOGEL 7.0

## (undated) DEVICE — DRAPE HAND STERILE

## (undated) DEVICE — SET ENDOTRAC BLADE SYS DISP

## (undated) DEVICE — SPONGE BULKEE II ABSRB 6X6.75

## (undated) DEVICE — TOURNIQUET SB QC DP 18X4IN

## (undated) DEVICE — APPLICATOR CHLORAPREP ORN 26ML

## (undated) DEVICE — BANDAGE DERMACEA STRETCH 4X1IN

## (undated) DEVICE — CORD BIPOLAR 12 FOOT

## (undated) DEVICE — SUT 2/0 18IN COATED VICRYL

## (undated) DEVICE — SUT ETHILON 3-0 PS2 18 BLK

## (undated) DEVICE — SPONGE GAUZE 16PLY 4X4

## (undated) DEVICE — PACK SIRUS BASIC V SURG STRL

## (undated) DEVICE — DRESSING XEROFORM FOIL PK 1X8

## (undated) DEVICE — SUT 4-0 ETHILON 18 PS-2

## (undated) DEVICE — NDL HYPODERMIC BLUNT 18G 1.5IN

## (undated) DEVICE — GLOVE SENSICARE PI ALOE 7.5

## (undated) DEVICE — NDL SPINAL 25GX3.5 SPINOCAN

## (undated) DEVICE — STRAP OR TABLE 5IN X 72IN

## (undated) DEVICE — KIT ANTIFOG

## (undated) DEVICE — NDL SPINAL 22GX5

## (undated) DEVICE — GLOVE SENSICARE PI ALOE 6.5

## (undated) DEVICE — CHLORAPREP 10.5 ML APPLICATOR

## (undated) DEVICE — SOL NACL IRR 1000ML BTL

## (undated) DEVICE — GOWN POLY REINF BRTH SLV XL

## (undated) DEVICE — TOWEL OR DISP STRL BLUE 4/PK

## (undated) DEVICE — SEE L#120831

## (undated) DEVICE — SEE MEDLINE ITEM 146308

## (undated) DEVICE — SYS LABEL CORRECT MED

## (undated) DEVICE — COVER OVERHEAD SURG LT BLUE

## (undated) DEVICE — GAUZE WOVEN STRL 12-PLY 4X4IN

## (undated) DEVICE — SEE MEDLINE ITEM 152564

## (undated) DEVICE — SEE MEDLINE ITEM 157116

## (undated) DEVICE — SUT 3-0 VICRYL / FS-2

## (undated) DEVICE — SEE MEDLINE ITEM 156953

## (undated) DEVICE — BNDG COFLEX FOAM LF2 ST 3X5YD

## (undated) DEVICE — NDL SAFETY 21G X 1 1/2 ECLPSE

## (undated) DEVICE — DRAPE INVISISHIELD TOWEL SMALL

## (undated) DEVICE — GLOVE SURG ULTRA TOUCH 7.5

## (undated) DEVICE — SUT ETHILON 3/0 18IN PS-1

## (undated) DEVICE — BANDAGE MATRIX HK LOOP 2IN 5YD

## (undated) DEVICE — BANDAGE ESMARK ELASTIC ST 4X9

## (undated) DEVICE — DRAPE THREE-QTR REINF 53X77IN

## (undated) DEVICE — PACK SET UP 190 OMC-NS

## (undated) DEVICE — BANDAGE CONFORM 3IN STRL

## (undated) DEVICE — SEE MEDLINE ITEM 152622

## (undated) DEVICE — SYR DISP LL 5CC

## (undated) DEVICE — GLOVE SENSICARE PI GRN 7

## (undated) DEVICE — NDL 18GA X1 1/2 REG BEVEL

## (undated) DEVICE — STOCKINET TUBULAR 1 PLY 6X60IN

## (undated) DEVICE — SYR ONLY LUER LOCK 20CC

## (undated) DEVICE — STOCKINETTE TUBULAR 2PL 6 X 4

## (undated) DEVICE — COVER SURG LIGHT HANDLE

## (undated) DEVICE — DRAPE U SPLIT SHEET 54X76IN